# Patient Record
Sex: FEMALE | Race: BLACK OR AFRICAN AMERICAN | Employment: UNEMPLOYED | URBAN - METROPOLITAN AREA
[De-identification: names, ages, dates, MRNs, and addresses within clinical notes are randomized per-mention and may not be internally consistent; named-entity substitution may affect disease eponyms.]

---

## 2017-06-22 ENCOUNTER — HOSPITAL ENCOUNTER (EMERGENCY)
Age: 32
Discharge: HOME | End: 2017-06-22
Attending: EMERGENCY MEDICINE
Payer: MEDICAID

## 2017-06-22 VITALS
RESPIRATION RATE: 16 BRPM | WEIGHT: 152 LBS | SYSTOLIC BLOOD PRESSURE: 132 MMHG | OXYGEN SATURATION: 98 % | DIASTOLIC BLOOD PRESSURE: 88 MMHG | HEART RATE: 76 BPM | BODY MASS INDEX: 25.95 KG/M2 | HEIGHT: 64 IN | TEMPERATURE: 98.5 F

## 2017-06-22 DIAGNOSIS — S01.81XA LACERATION OF FACE, INITIAL ENCOUNTER: Primary | ICD-10-CM

## 2017-06-22 PROCEDURE — 74011000250 HC RX REV CODE- 250: Performed by: PHYSICIAN ASSISTANT

## 2017-06-22 PROCEDURE — 74011250637 HC RX REV CODE- 250/637: Performed by: PHYSICIAN ASSISTANT

## 2017-06-22 PROCEDURE — 99284 EMERGENCY DEPT VISIT MOD MDM: CPT

## 2017-06-22 RX ORDER — MORPHINE SULFATE 2 MG/ML
4 INJECTION, SOLUTION INTRAMUSCULAR; INTRAVENOUS
Status: DISCONTINUED | OUTPATIENT
Start: 2017-06-22 | End: 2017-06-22 | Stop reason: HOSPADM

## 2017-06-22 RX ORDER — IBUPROFEN 400 MG/1
400 TABLET ORAL
Status: COMPLETED | OUTPATIENT
Start: 2017-06-22 | End: 2017-06-22

## 2017-06-22 RX ORDER — HYDROCODONE BITARTRATE AND ACETAMINOPHEN 5; 325 MG/1; MG/1
1 TABLET ORAL
Qty: 6 TAB | Refills: 0 | Status: SHIPPED | OUTPATIENT
Start: 2017-06-22 | End: 2017-12-12

## 2017-06-22 RX ORDER — NAPROXEN 500 MG/1
500 TABLET ORAL 2 TIMES DAILY WITH MEALS
Qty: 20 TAB | Refills: 0 | Status: SHIPPED | OUTPATIENT
Start: 2017-06-22 | End: 2017-07-02

## 2017-06-22 RX ORDER — LIDOCAINE HYDROCHLORIDE 20 MG/ML
5 INJECTION, SOLUTION INFILTRATION; PERINEURAL ONCE
Status: COMPLETED | OUTPATIENT
Start: 2017-06-22 | End: 2017-06-22

## 2017-06-22 RX ADMIN — IBUPROFEN 400 MG: 400 TABLET, FILM COATED ORAL at 10:42

## 2017-06-22 RX ADMIN — LIDOCAINE HYDROCHLORIDE 100 MG: 20 INJECTION, SOLUTION INFILTRATION; PERINEURAL at 10:43

## 2017-06-22 RX ADMIN — NEOMYCIN-BACITRACIN-POLYMYXIN OINT 1 PACKET: OINTMENT at 11:33
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
## 2017-06-22 NOTE — FORENSIC NURSE
[facility] Mustian contacted by Jeannine Kendall RN; reason for being evaluated discussed. Patient declines law enforcement and forensic involvement at this time. RN denies any concerns for safety.

## 2017-06-22 NOTE — DISCHARGE INSTRUCTIONS
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
## 2017-06-22 NOTE — PROGRESS NOTES
Spiritual Care Partner Volunteer visited patient in Emergency Room on 6/22/17.   Documented by:  Jenni Cho [address] [facility] [place] ([id number])
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
## 2017-12-12 ENCOUNTER — HOSPITAL ENCOUNTER (INPATIENT)
Age: 32
LOS: 2 days | Discharge: HOME | DRG: 885 | End: 2017-12-15
Attending: EMERGENCY MEDICINE | Admitting: PSYCHIATRY & NEUROLOGY
Payer: SUBSIDIZED

## 2017-12-12 DIAGNOSIS — F19.20 POLYSUBSTANCE DEPENDENCE (HCC): ICD-10-CM

## 2017-12-12 DIAGNOSIS — F19.94 SUBSTANCE INDUCED MOOD DISORDER (HCC): Primary | ICD-10-CM

## 2017-12-12 PROBLEM — F31.9 BIPOLAR DISORDER (HCC): Status: ACTIVE | Noted: 2017-12-12

## 2017-12-12 LAB
AMPHET UR QL SCN: NEGATIVE
ANION GAP SERPL CALC-SCNC: 7 MMOL/L (ref 5–15)
APAP SERPL-MCNC: <2 UG/ML (ref 10–30)
APPEARANCE UR: ABNORMAL
BARBITURATES UR QL SCN: NEGATIVE
BASOPHILS # BLD: 0 K/UL (ref 0–0.1)
BASOPHILS NFR BLD: 0 % (ref 0–1)
BENZODIAZ UR QL: NEGATIVE
BILIRUB UR QL: NEGATIVE
BUN SERPL-MCNC: 11 MG/DL (ref 6–20)
BUN/CREAT SERPL: 10 (ref 12–20)
CALCIUM SERPL-MCNC: 9.2 MG/DL (ref 8.5–10.1)
CANNABINOIDS UR QL SCN: POSITIVE
CHLORIDE SERPL-SCNC: 104 MMOL/L (ref 97–108)
CO2 SERPL-SCNC: 29 MMOL/L (ref 21–32)
COCAINE UR QL SCN: POSITIVE
COLOR UR: ABNORMAL
CREAT SERPL-MCNC: 1.15 MG/DL (ref 0.55–1.02)
DRUG SCRN COMMENT,DRGCM: ABNORMAL
EOSINOPHIL # BLD: 0.1 K/UL (ref 0–0.4)
EOSINOPHIL NFR BLD: 1 % (ref 0–7)
ERYTHROCYTE [DISTWIDTH] IN BLOOD BY AUTOMATED COUNT: 14.4 % (ref 11.5–14.5)
ETHANOL SERPL-MCNC: <10 MG/DL
GLUCOSE SERPL-MCNC: 93 MG/DL (ref 65–100)
GLUCOSE UR STRIP.AUTO-MCNC: NEGATIVE MG/DL
HCG UR QL: NEGATIVE
HCT VFR BLD AUTO: 43.2 % (ref 35–47)
HGB BLD-MCNC: 13.8 G/DL (ref 11.5–16)
HGB UR QL STRIP: NEGATIVE
KETONES UR QL STRIP.AUTO: NEGATIVE MG/DL
LEUKOCYTE ESTERASE UR QL STRIP.AUTO: NEGATIVE
LYMPHOCYTES # BLD: 2 K/UL (ref 0.8–3.5)
LYMPHOCYTES NFR BLD: 20 % (ref 12–49)
MCH RBC QN AUTO: 26.7 PG (ref 26–34)
MCHC RBC AUTO-ENTMCNC: 31.9 G/DL (ref 30–36.5)
MCV RBC AUTO: 83.6 FL (ref 80–99)
METHADONE UR QL: POSITIVE
MONOCYTES # BLD: 0.3 K/UL (ref 0–1)
MONOCYTES NFR BLD: 3 % (ref 5–13)
NEUTS SEG # BLD: 7.4 K/UL (ref 1.8–8)
NEUTS SEG NFR BLD: 76 % (ref 32–75)
NITRITE UR QL STRIP.AUTO: NEGATIVE
OPIATES UR QL: POSITIVE
PCP UR QL: NEGATIVE
PH UR STRIP: 6.5 [PH] (ref 5–8)
PLATELET # BLD AUTO: 270 K/UL (ref 150–400)
POTASSIUM SERPL-SCNC: 3.5 MMOL/L (ref 3.5–5.1)
PROT UR STRIP-MCNC: NEGATIVE MG/DL
RBC # BLD AUTO: 5.17 M/UL (ref 3.8–5.2)
SALICYLATES SERPL-MCNC: 2.8 MG/DL (ref 2.8–20)
SODIUM SERPL-SCNC: 140 MMOL/L (ref 136–145)
SP GR UR REFRACTOMETRY: 1.03 (ref 1–1.03)
UROBILINOGEN UR QL STRIP.AUTO: 1 EU/DL (ref 0.2–1)
WBC # BLD AUTO: 9.7 K/UL (ref 3.6–11)

## 2017-12-12 PROCEDURE — 81003 URINALYSIS AUTO W/O SCOPE: CPT | Performed by: EMERGENCY MEDICINE

## 2017-12-12 PROCEDURE — 74011250637 HC RX REV CODE- 250/637: Performed by: EMERGENCY MEDICINE

## 2017-12-12 PROCEDURE — 36415 COLL VENOUS BLD VENIPUNCTURE: CPT | Performed by: EMERGENCY MEDICINE

## 2017-12-12 PROCEDURE — 81025 URINE PREGNANCY TEST: CPT

## 2017-12-12 PROCEDURE — 80307 DRUG TEST PRSMV CHEM ANLYZR: CPT | Performed by: EMERGENCY MEDICINE

## 2017-12-12 PROCEDURE — 99285 EMERGENCY DEPT VISIT HI MDM: CPT

## 2017-12-12 PROCEDURE — 90791 PSYCH DIAGNOSTIC EVALUATION: CPT

## 2017-12-12 PROCEDURE — 85025 COMPLETE CBC W/AUTO DIFF WBC: CPT | Performed by: EMERGENCY MEDICINE

## 2017-12-12 PROCEDURE — 80048 BASIC METABOLIC PNL TOTAL CA: CPT | Performed by: EMERGENCY MEDICINE

## 2017-12-12 RX ORDER — CLONIDINE HYDROCHLORIDE 0.1 MG/1
0.2 TABLET ORAL
Status: COMPLETED | OUTPATIENT
Start: 2017-12-12 | End: 2017-12-12

## 2017-12-12 RX ADMIN — CLONIDINE HYDROCHLORIDE 0.2 MG: 0.1 TABLET ORAL at 22:05

## 2017-12-12 NOTE — IP AVS SNAPSHOT
Lidya Wiley 
 
 
 [address] Patient: Elie Guerra MRN: [id number] QWM:[birth year] About your hospitalization You were admitted on:  December 13, 2017 You last received care in the:  [facility]. [address] You were discharged on:  December 15, 2017 Why you were hospitalized Your primary diagnosis was:  Not on File Your diagnoses also included:  Bipolar Disorder (Hcc), Substance Induced Mood Disorder (Hcc) Things You Need To Do (next 8 weeks) Follow up with Debbi Hawthorne MD  
  
Phone:  [phone] Where:  [address] [facility],[address]Th Floor [facility], [address] [place] [address] Go to [address] [place] [address] in 1 week(s) Intake appointment for services. Due to the limited slots, you may want to arrive by 7:00am. The doors open at 7:30am and you should sign in at the registration desk. Where:  [facility] [address] Hours: Monday-Friday 8:30am- 12:00PM & 1:00PM-4:30pm  
[phone] (phone) [phone] (fax) Go to [facility] REGISTRATION TIME IS FROM 7:00pm-9:00pm.  
  
Where:  [address] [facility], [address] [facility]. Hours of operation are 7:00 pm to 10:00 am.    
  
   
 Monday Dec 18, 2017 Go to [facility] Rapid access appointment 8:00AM -11:00AM & 12:00PM-3:00PM for mental health and substance abuse services. Where:  [employer] [address] [facility] 
[phone] Fax: [phone] Discharge Orders None A check silviano indicates which time of day the medication should be taken. My Medications Notice You have not been prescribed any medications. Discharge Instructions DISCHARGE SUMMARY from Nurse The following personal items are in your possession at time of discharge: 
 
  
Visual Aid: None Jewelry: Earrings (ordaz earrings in purse) Clothing: Belt, Jacket/Coat, Slippers, Other (comment) (scarf,head scarf) Other Valuables: Cell Phone, Purse, Wallet, Cigarettes, Lighter/matches (2 cell phones cigarettes lighter 15cents) PATIENT INSTRUCTIONS: 
 
If I feel that I can not keep these promises and I am at risk of hurting myself or others, I will call the crisis office and speak with a crisis worker who will assist me during my crisis. [address] [facility]  [phone]   [phone] [facility] [place] [facility]  [phone] [facility]  [phone] These are general instructions for a healthy lifestyle: No smoking/ No tobacco products/ Avoid exposure to second hand smoke Surgeon General's Warning:  Quitting smoking now greatly reduces serious risk to your health. Obesity, smoking, and sedentary lifestyle greatly increases your risk for illness A healthy diet, regular physical exercise & weight monitoring are important for maintaining a healthy lifestyle You may be retaining fluid if you have a history of heart failure or if you experience any of the following symptoms:  Weight gain of 3 pounds or more overnight or 5 pounds in a week, increased swelling in our hands or feet or shortness of breath while lying flat in bed. Please call your doctor as soon as you notice any of these symptoms; do not wait until your next office visit. Recognize signs and symptoms of STROKE: 
 
F-face looks uneven A-arms unable to move or move unevenly S-speech slurred or non-existent T-time-call 911 as soon as signs and symptoms begin-DO NOT go Back to bed or wait to see if you get better-TIME IS BRAIN. Warning Signs of HEART ATTACK Call 911 if you have these symptoms: 
? Chest discomfort.  Most heart attacks involve discomfort in the center of the chest that lasts more than a few minutes, or that goes away and comes back. It can feel like uncomfortable pressure, squeezing, fullness, or pain. ? Discomfort in other areas of the upper body. Symptoms can include pain or discomfort in one or both arms, the back, neck, jaw, or stomach. ? Shortness of breath with or without chest discomfort. ? Other signs may include breaking out in a cold sweat, nausea, or lightheadedness. Don't wait more than five minutes to call [address]! Fast action can save your life. Calling 911 is almost always the fastest way to get lifesaving treatment. Emergency Medical Services staff can begin treatment when they arrive  up to an hour sooner than if someone gets to the hospital by car. Myself and/or my family have received education about my diagnosis throughout my hospital stay. During my hospital stay, I and/or my family/caregiver were included in planning my care upon discharge. My needs were taken into consideration and I was included in my discharge planning. I had an opportunity to ask questions. The discharge information has been reviewed with the patient. The patient verbalized understanding. Discharge medications reviewed with the patient and appropriate educational materials and side effects teaching were provided. [facility] Announcement We are excited to announce that we are making your provider's discharge notes available to you in [employer] [facility]. You will see these notes when they are completed and signed by the physician that discharged you from your recent hospital stay. If you have any questions or concerns about any information you see in [employer], please call the Health Information Department where you were seen or reach out to your Primary Care Provider for more information about your plan of care. Introducing [facility] SERVICES!    
 [employer] introduces [employer] [facility] patient portal. Now you can access parts of your medical record, email your doctor's office, and request medication refills online. 1. In your internet browser, go to [email or web address] 2. Click on the First Time User? Click Here link in the Sign In box. You will see the New Member Sign Up page. 3. Enter your [employer] Access Code exactly as it appears below. You will not need to use this code after youve completed the sign-up process. If you do not sign up before the expiration date, you must request a new code. · [employer] Access Code: [email or web address] Expires: 3/14/2018 10:37 PM 
 
4. Enter the last four digits of your Social Security Number (xxxx) and Date of Birth (mm/dd/yyyy) as indicated and click Submit. You will be taken to the next sign-up page. 5. Create a [employer] ID. This will be your [employer] login ID and cannot be changed, so think of one that is secure and easy to remember. 6. Create a [employer] password. You can change your password at any time. 7. Enter your Password Reset Question and Answer. This can be used at a later time if you forget your password. 8. Enter your e-mail address. You will receive e-mail notification when new information is available in [address] 9. Click Sign Up. You can now view and download portions of your medical record. 10. Click the Download Summary menu link to download a portable copy of your medical information. If you have questions, please visit the Frequently Asked Questions section of the [employer] website. Remember, [employer] is NOT to be used for urgent needs. For medical emergencies, dial 911. Now available from your iPhone and Android! Unresulted Labs-Please follow up with your PCP about these lab tests Order Current Status EKG, 12 LEAD, INITIAL Preliminary result Providers Seen During Your Hospitalization Provider Specialty Primary office phone Zara Sanchez MD Emergency Medicine [phone] Jono Carrero MD Psychiatry [phone] Margi Barcaly MD Psychiatry [phone] Immunizations Administered for This Admission Name Date Influenza Vaccine (Quad) PF 12/13/2017 Your Primary Care Physician (PCP) Primary Care Physician Office Phone Office Fax Ana, [address] [phone] You are allergic to the following Allergen Reactions Tramadol Rash Recent Documentation Height Weight Breastfeeding? BMI OB Status Smoking Status 1.626 m 59.9 kg No 22.66 kg/m2 Having regular periods Current Every Day Smoker Emergency Contacts Name Discharge Info Relation Home Work Mobile [address] CAREGIVER [3] Other Relative [6] [phone] [address] CAREGIVER [3] Parent [1] [phone] Patient Belongings The following personal items are in your possession at time of discharge: 
     Visual Aid: None          Jewelry: Earrings (ordaz earrings in purse)  Clothing: Belt, Jacket/Coat, Slippers, Other (comment) (scarf,head scarf)    Other Valuables: Cell Phone, Purse, Wallet, Cigarettes, Lighter/matches (2 cell phones cigarettes lighter 15cents) Please provide this summary of care documentation to your next provider. Signatures-by signing, you are acknowledging that this After Visit Summary has been reviewed with you and you have received a copy. Patient Signature:  ____________________________________________________________ Date:  ____________________________________________________________  
  
[facility] Provider Signature:  ____________________________________________________________ Date:  ____________________________________________________________

## 2017-12-12 NOTE — IP AVS SNAPSHOT
[address] [place] [address] 
 
 
 [address] Patient: Lee Osler MRN: [id number] ROM:8/1/1708 My Medications Notice You have not been prescribed any medications.

## 2017-12-13 LAB
CHOLEST SERPL-MCNC: 128 MG/DL
HDLC SERPL-MCNC: 56 MG/DL
HDLC SERPL: 2.3 {RATIO} (ref 0–5)
LDLC SERPL CALC-MCNC: 55.2 MG/DL (ref 0–100)
LIPID PROFILE,FLP: NORMAL
TRIGL SERPL-MCNC: 84 MG/DL (ref ?–150)
TSH SERPL DL<=0.05 MIU/L-ACNC: 1.21 UIU/ML (ref 0.36–3.74)
VLDLC SERPL CALC-MCNC: 16.8 MG/DL

## 2017-12-13 PROCEDURE — 65220000003 HC RM SEMIPRIVATE PSYCH

## 2017-12-13 PROCEDURE — 36415 COLL VENOUS BLD VENIPUNCTURE: CPT | Performed by: PSYCHIATRY & NEUROLOGY

## 2017-12-13 PROCEDURE — 74011250636 HC RX REV CODE- 250/636: Performed by: PSYCHIATRY & NEUROLOGY

## 2017-12-13 PROCEDURE — 84443 ASSAY THYROID STIM HORMONE: CPT | Performed by: PSYCHIATRY & NEUROLOGY

## 2017-12-13 PROCEDURE — 74011250637 HC RX REV CODE- 250/637: Performed by: PSYCHIATRY & NEUROLOGY

## 2017-12-13 PROCEDURE — 90471 IMMUNIZATION ADMIN: CPT

## 2017-12-13 PROCEDURE — 90686 IIV4 VACC NO PRSV 0.5 ML IM: CPT | Performed by: PSYCHIATRY & NEUROLOGY

## 2017-12-13 PROCEDURE — 80061 LIPID PANEL: CPT | Performed by: PSYCHIATRY & NEUROLOGY

## 2017-12-13 RX ORDER — IBUPROFEN 400 MG/1
400 TABLET ORAL
Status: DISCONTINUED | OUTPATIENT
Start: 2017-12-13 | End: 2017-12-15 | Stop reason: HOSPADM

## 2017-12-13 RX ORDER — LORAZEPAM 2 MG/ML
2 INJECTION INTRAMUSCULAR
Status: DISCONTINUED | OUTPATIENT
Start: 2017-12-13 | End: 2017-12-13

## 2017-12-13 RX ORDER — BENZTROPINE MESYLATE 2 MG/1
2 TABLET ORAL
Status: DISCONTINUED | OUTPATIENT
Start: 2017-12-13 | End: 2017-12-15 | Stop reason: HOSPADM

## 2017-12-13 RX ORDER — BENZTROPINE MESYLATE 1 MG/ML
2 INJECTION INTRAMUSCULAR; INTRAVENOUS
Status: DISCONTINUED | OUTPATIENT
Start: 2017-12-13 | End: 2017-12-15 | Stop reason: HOSPADM

## 2017-12-13 RX ORDER — OLANZAPINE 5 MG/1
5 TABLET ORAL
Status: DISCONTINUED | OUTPATIENT
Start: 2017-12-13 | End: 2017-12-15 | Stop reason: HOSPADM

## 2017-12-13 RX ORDER — ADHESIVE BANDAGE
30 BANDAGE TOPICAL DAILY PRN
Status: DISCONTINUED | OUTPATIENT
Start: 2017-12-13 | End: 2017-12-15 | Stop reason: HOSPADM

## 2017-12-13 RX ORDER — LORAZEPAM 1 MG/1
1 TABLET ORAL
Status: DISCONTINUED | OUTPATIENT
Start: 2017-12-13 | End: 2017-12-13

## 2017-12-13 RX ORDER — METHADONE HYDROCHLORIDE 10 MG/1
20 TABLET ORAL
Status: COMPLETED | OUTPATIENT
Start: 2017-12-13 | End: 2017-12-13

## 2017-12-13 RX ORDER — ACETAMINOPHEN 325 MG/1
650 TABLET ORAL
Status: DISCONTINUED | OUTPATIENT
Start: 2017-12-13 | End: 2017-12-15 | Stop reason: HOSPADM

## 2017-12-13 RX ORDER — ZOLPIDEM TARTRATE 10 MG/1
10 TABLET ORAL
Status: DISCONTINUED | OUTPATIENT
Start: 2017-12-13 | End: 2017-12-15 | Stop reason: HOSPADM

## 2017-12-13 RX ORDER — IBUPROFEN 200 MG
1 TABLET ORAL
Status: DISCONTINUED | OUTPATIENT
Start: 2017-12-13 | End: 2017-12-15 | Stop reason: HOSPADM

## 2017-12-13 RX ADMIN — ZOLPIDEM TARTRATE 10 MG: 10 TABLET, FILM COATED ORAL at 21:28

## 2017-12-13 RX ADMIN — INFLUENZA VIRUS VACCINE 0.5 ML: 15; 15; 15; 15 SUSPENSION INTRAMUSCULAR at 09:44

## 2017-12-13 RX ADMIN — METHADONE HYDROCHLORIDE 20 MG: 10 TABLET ORAL at 18:22

## 2017-12-13 NOTE — ED PROVIDER NOTES
EMERGENCY DEPARTMENT HISTORY AND PHYSICAL EXAM      Date: 2017  Patient Name: Neetu Weinstein    History of Presenting Illness     Chief Complaint   Patient presents with    Suicidal    Drug Problem       History Provided By: Patient    HPI: Neetu Weinstein, 34 y.o. female with PMHx significant for depressive disorder who presents ambulatory to the ED in need of a mental health evaluation. Pt reports that she was discharged from the methadone clinic yesterday. She reports being referred to the ED by Human resources. Pt reports having SI noting that she is unsure if she would do it. She also reports having HI but reports that she \"knows she does not want to hurt anyone. \" Pt also c/o generalize body aches that she describes at [facility] like I'm going through withdrawal.\" She reports associated diaphoresis, and abdominal pain. Pt denies use of medication to modify her symptoms. She specifically denies any nausea, vomiting, diarrhea, CP, SOB, fevers, chills, or HA.    + tobacco use (0.5 ppd), + EtOH use, + Illicit drug use    PCP: Allyson Galan MD    There are no other complaints, changes, or physical findings at this time.         Past History     Past Medical History:  Past Medical History:   Diagnosis Date    Depressive disorder, not elsewhere classified 2014       Past Surgical History:  Past Surgical History:   Procedure Laterality Date    BREAST SURGERY PROCEDURE UNLISTED      cyst removal    HX GYN          HX OTHER SURGICAL      fibroid removal left breast       Family History:  Family History   Problem Relation Age of Onset    Malignant Hyperthermia Neg Hx     Pseudocholinesterase Deficiency Neg Hx     Delayed Awakening Neg Hx     Post-op Nausea/Vomiting Neg Hx     Emergence Delirium Neg Hx     Post-op Cognitive Dysfunction Neg Hx     Other Neg Hx        Social History:  Social History   Substance Use Topics    Smoking status: Current Every Day Smoker     Packs/day: 0.50     Years: 10.00    Smokeless tobacco: Current User    Alcohol use Yes      Comment: socially       Allergies: Allergies   Allergen Reactions    Tramadol Rash         Review of Systems   Review of Systems   Constitutional: Positive for diaphoresis. Negative for chills, fever and unexpected weight change. HENT: Negative. Negative for congestion and trouble swallowing. Eyes: Negative for discharge. Respiratory: Negative. Negative for cough, chest tightness and shortness of breath. Cardiovascular: Negative. Negative for chest pain. Gastrointestinal: Positive for abdominal pain. Negative for abdominal distention, constipation, diarrhea, nausea and vomiting. Endocrine: Negative. Genitourinary: Negative. Negative for difficulty urinating, dysuria, frequency and urgency. Musculoskeletal: Positive for myalgias (generalized). Negative for arthralgias. Skin: Negative. Negative for color change. Allergic/Immunologic: Negative. Neurological: Negative. Negative for dizziness, speech difficulty and headaches. Hematological: Negative. Psychiatric/Behavioral: Negative. Negative for agitation, confusion and suicidal ideas. - HI   All other systems reviewed and are negative. Physical Exam   Physical Exam   Constitutional: She is oriented to person, place, and time. She appears well-developed and well-nourished. HENT:   Head: Normocephalic and atraumatic. Eyes: Conjunctivae and EOM are normal.   Pupils are 3 mm and reactive   Neck: Neck supple. Cardiovascular: Normal rate, regular rhythm and intact distal pulses. Pulmonary/Chest: Effort normal. No respiratory distress. Abdominal: Soft. Bowel sounds are increased. There is no tenderness. Musculoskeletal: Normal range of motion. She exhibits no deformity. Neurological: She is alert and oriented to person, place, and time. Skin: Skin is warm and dry. Psychiatric: Thought content normal. She is agitated.    Tearful   Vitals reviewed. Diagnostic Study Results     Labs -     Recent Results (from the past 12 hour(s))   CBC WITH AUTOMATED DIFF    Collection Time: 12/12/17  9:44 PM   Result Value Ref Range    WBC 9.7 3.6 - 11.0 K/uL    RBC 5.17 3.80 - 5.20 M/uL    HGB 13.8 11.5 - 16.0 g/dL    HCT 43.2 35.0 - 47.0 %    MCV 83.6 80.0 - 99.0 FL    MCH 26.7 26.0 - 34.0 PG    MCHC 31.9 30.0 - 36.5 g/dL    RDW 14.4 11.5 - 14.5 %    PLATELET 388 529 - 786 K/uL    NEUTROPHILS 76 (H) 32 - 75 %    LYMPHOCYTES 20 12 - 49 %    MONOCYTES 3 (L) 5 - 13 %    EOSINOPHILS 1 0 - 7 %    BASOPHILS 0 0 - 1 %    ABS. NEUTROPHILS 7.4 1.8 - 8.0 K/UL    ABS. LYMPHOCYTES 2.0 0.8 - 3.5 K/UL    ABS. MONOCYTES 0.3 0.0 - 1.0 K/UL    ABS. EOSINOPHILS 0.1 0.0 - 0.4 K/UL    ABS.  BASOPHILS 0.0 0.0 - 0.1 K/UL   URINALYSIS W/ RFLX MICROSCOPIC    Collection Time: 12/12/17  9:44 PM   Result Value Ref Range    Color YELLOW/STRAW      Appearance CLOUDY (A) CLEAR      Specific gravity 1.030 1.003 - 1.030      pH (UA) 6.5 5.0 - 8.0      Protein NEGATIVE  NEG mg/dL    Glucose NEGATIVE  NEG mg/dL    Ketone NEGATIVE  NEG mg/dL    Bilirubin NEGATIVE  NEG      Blood NEGATIVE  NEG      Urobilinogen 1.0 0.2 - 1.0 EU/dL    Nitrites NEGATIVE  NEG      Leukocyte Esterase NEGATIVE  NEG     METABOLIC PANEL, BASIC    Collection Time: 12/12/17  9:44 PM   Result Value Ref Range    Sodium 140 136 - 145 mmol/L    Potassium 3.5 3.5 - 5.1 mmol/L    Chloride 104 97 - 108 mmol/L    CO2 29 21 - 32 mmol/L    Anion gap 7 5 - 15 mmol/L    Glucose 93 65 - 100 mg/dL    BUN 11 6 - 20 MG/DL    Creatinine 1.15 (H) 0.55 - 1.02 MG/DL    BUN/Creatinine ratio 10 (L) 12 - 20      GFR est AA >60 >60 ml/min/1.73m2    GFR est non-AA 54 (L) >60 ml/min/1.73m2    Calcium 9.2 8.5 - 10.1 MG/DL   DRUG SCREEN, URINE    Collection Time: 12/12/17  9:44 PM   Result Value Ref Range    AMPHETAMINES NEGATIVE  NEG      BARBITURATES NEGATIVE  NEG      BENZODIAZEPINES NEGATIVE  NEG      COCAINE POSITIVE (A) NEG      METHADONE POSITIVE (A) NEG      OPIATES POSITIVE (A) NEG      PCP(PHENCYCLIDINE) NEGATIVE  NEG      THC (TH-CANNABINOL) POSITIVE (A) NEG      Drug screen comment (NOTE)    ETHYL ALCOHOL    Collection Time: 12/12/17  9:44 PM   Result Value Ref Range    ALCOHOL(ETHYL),SERUM <10 <10 MG/DL   ACETAMINOPHEN    Collection Time: 12/12/17  9:44 PM   Result Value Ref Range    Acetaminophen level <2 (L) 10 - 30 ug/mL   SALICYLATE    Collection Time: 12/12/17  9:44 PM   Result Value Ref Range    Salicylate level 2.8 2.8 - 20.0 MG/DL   HCG URINE, QL. - POC    Collection Time: 12/12/17  9:47 PM   Result Value Ref Range    Pregnancy test,urine (POC) NEGATIVE  NEG       Medical Decision Making   I am the first provider for this patient. I reviewed the vital signs, available nursing notes, past medical history, past surgical history, family history and social history. Vital Signs-Reviewed the patient's vital signs. Patient Vitals for the past 12 hrs:   Temp Pulse Resp BP SpO2   12/12/17 2332 - 67 16 111/76 100 %   12/12/17 2317 98.3 °F (36.8 °C) 76 16 (!) 131/96 100 %   12/12/17 2158 98.2 °F (36.8 °C) 67 20 132/69 100 %   12/12/17 2119 98.7 °F (37.1 °C) (!) 105 18 149/71 100 %     Records Reviewed: Nursing Notes and Old Medical Records    Provider Notes (Medical Decision Making): Withdrawal, depression, SI, polysubstance abuse    ED Course:   Initial assessment performed. The patients presenting problems have been discussed, and they are in agreement with the care plan formulated and outlined with them. I have encouraged them to ask questions as they arise throughout their visit. Disposition:    ADMIT NOTE:  11:50 AM  Patient is being admitted to the hospital.  The results of their tests and reasons for their admission have been discussed with them and/or available family. They convey agreement and understanding for the need to be admitted and for their admission diagnosis.   Consultation has been made with the inpatient physician specialist for hospitalization. PLAN:  1. Admit to Behavioral Health    Diagnosis     Clinical Impression: No diagnosis found. Attestations: This note is prepared by Charmayne Cohen, acting as Scribe for Khanh Miles MD.    Khanh Miles MD: The scribe's documentation has been prepared under my direction and personally reviewed by me in its entirety. I confirm that the note above accurately reflects all work, treatment, procedures, and medical decision making performed by me.

## 2017-12-13 NOTE — ED TRIAGE NOTES
Per pt she was on Methadone and was taken off yesterday and now feels suicidal, complains of body pains

## 2017-12-13 NOTE — H&P
History and Physical    Subjective:     Bryson Estrada is a 34 y.o. with past medical hx significant for substance abuse. Pt admitted under a voluntary basis for opiate withdrawal and depression proving to be a danger to self and others. Pt is asking for Methadone. She is maintained on Methadone in one of the local detox clinics. Pt is a 34 y.o. BLACK OR  female with a past psychiatric history significant for opiate withdrawal , who presents at this time with complaints of (and/or evidence of) the following emotional symptoms: depression. Additional symptomatology include anxiety and anxiety attacks. The above symptoms have been present for weeks . These symptoms are of moderate severity. These symptoms are constant  in nature. The patient's condition has been precipitated by leaving Methadone program and psychosocial stressors (unemplyed  ). Patient's condition made worse by continued illicit drug use treatment noncompliance. UDS: positive  BAL=0. She is not feeling suicidal and no psychotic features andno manic symptoms . She is c/o abdominal pain due to withdrawal from opiate.  She is asking for Methadone        Past Medical History:   Diagnosis Date    Depressive disorder, not elsewhere classified 2014      Past Surgical History:   Procedure Laterality Date    BREAST SURGERY PROCEDURE UNLISTED      cyst removal    HX GYN          HX OTHER SURGICAL      fibroid removal left breast     Family History   Problem Relation Age of Onset    Malignant Hyperthermia Neg Hx     Pseudocholinesterase Deficiency Neg Hx     Delayed Awakening Neg Hx     Post-op Nausea/Vomiting Neg Hx     Emergence Delirium Neg Hx     Post-op Cognitive Dysfunction Neg Hx     Other Neg Hx       Social History   Substance Use Topics    Smoking status: Current Every Day Smoker     Packs/day: 0.50     Years: 10.00    Smokeless tobacco: Current User    Alcohol use Yes      Comment: socially Prior to Admission medications    Not on File     Allergies   Allergen Reactions    Tramadol Rash        Review of Systems:  Constitutional: negative  Eyes: negative  Ears, Nose, Mouth, Throat, and Face: negative  Respiratory: negative  Cardiovascular: negative  Gastrointestinal: negative  Genitourinary:negative  Integument/Breast: negative  Hematologic/Lymphatic: negative  Musculoskeletal:negative  Neurological: negative  Behavioral/Psychiatric: negative  Endocrine: negative  Allergic/Immunologic: negative     Objective: Intake and Output:            Physical Exam:   Visit Vitals    BP 94/59 (BP 1 Location: Right arm, BP Patient Position: Sitting)    Pulse (!) 55    Temp 97 °F (36.1 °C)    Resp 20    Ht 5' 4\" (1.626 m)    Wt 59.9 kg (132 lb)    LMP 12/03/2017    SpO2 100%    Breastfeeding No    BMI 22.66 kg/m2     General:  Alert, cooperative, no distress, appears stated age. Head:  Normocephalic, without obvious abnormality, atraumatic. Eyes:  Conjunctivae/corneas clear. PERRL, EOMs intact. Ears:  Normal external ear canals both ears. Nose: Nares normal. Septum midline. Mucosa normal. No drainage or sinus tenderness. Throat: Lips, mucosa, and tongue normal. Teeth and gums normal.   Neck: Supple, symmetrical, trachea midline, no adenopathy, thyroid: no enlargement/tenderness/nodules, no carotid bruit and no JVD. Back:   Symmetric, no curvature. ROM normal. No CVA tenderness. Lungs:   Clear to auscultation bilaterally. Chest wall:  No tenderness or deformity. Heart:  Regular rate and rhythm, S1, S2 normal, no murmur, click, rub or gallop. Abdomen:   Soft, non-tender. Bowel sounds normal. No masses,  No organomegaly. Extremities: Extremities normal, atraumatic, no cyanosis or edema. Pulses: 2+ and symmetric all extremities.    Skin: Skin color, texture, turgor normal. No rashes or lesions   Lymph nodes: Cervical, supraclavicular, and axillary nodes normal.   Neurologic: CNII-XII intact. Normal strength, sensation and reflexes throughout. Data Review:   Recent Results (from the past 24 hour(s))   CBC WITH AUTOMATED DIFF    Collection Time: 12/12/17  9:44 PM   Result Value Ref Range    WBC 9.7 3.6 - 11.0 K/uL    RBC 5.17 3.80 - 5.20 M/uL    HGB 13.8 11.5 - 16.0 g/dL    HCT 43.2 35.0 - 47.0 %    MCV 83.6 80.0 - 99.0 FL    MCH 26.7 26.0 - 34.0 PG    MCHC 31.9 30.0 - 36.5 g/dL    RDW 14.4 11.5 - 14.5 %    PLATELET 197 223 - 530 K/uL    NEUTROPHILS 76 (H) 32 - 75 %    LYMPHOCYTES 20 12 - 49 %    MONOCYTES 3 (L) 5 - 13 %    EOSINOPHILS 1 0 - 7 %    BASOPHILS 0 0 - 1 %    ABS. NEUTROPHILS 7.4 1.8 - 8.0 K/UL    ABS. LYMPHOCYTES 2.0 0.8 - 3.5 K/UL    ABS. MONOCYTES 0.3 0.0 - 1.0 K/UL    ABS. EOSINOPHILS 0.1 0.0 - 0.4 K/UL    ABS.  BASOPHILS 0.0 0.0 - 0.1 K/UL   URINALYSIS W/ RFLX MICROSCOPIC    Collection Time: 12/12/17  9:44 PM   Result Value Ref Range    Color YELLOW/STRAW      Appearance CLOUDY (A) CLEAR      Specific gravity 1.030 1.003 - 1.030      pH (UA) 6.5 5.0 - 8.0      Protein NEGATIVE  NEG mg/dL    Glucose NEGATIVE  NEG mg/dL    Ketone NEGATIVE  NEG mg/dL    Bilirubin NEGATIVE  NEG      Blood NEGATIVE  NEG      Urobilinogen 1.0 0.2 - 1.0 EU/dL    Nitrites NEGATIVE  NEG      Leukocyte Esterase NEGATIVE  NEG     METABOLIC PANEL, BASIC    Collection Time: 12/12/17  9:44 PM   Result Value Ref Range    Sodium 140 136 - 145 mmol/L    Potassium 3.5 3.5 - 5.1 mmol/L    Chloride 104 97 - 108 mmol/L    CO2 29 21 - 32 mmol/L    Anion gap 7 5 - 15 mmol/L    Glucose 93 65 - 100 mg/dL    BUN 11 6 - 20 MG/DL    Creatinine 1.15 (H) 0.55 - 1.02 MG/DL    BUN/Creatinine ratio 10 (L) 12 - 20      GFR est AA >60 >60 ml/min/1.73m2    GFR est non-AA 54 (L) >60 ml/min/1.73m2    Calcium 9.2 8.5 - 10.1 MG/DL   DRUG SCREEN, URINE    Collection Time: 12/12/17  9:44 PM   Result Value Ref Range    AMPHETAMINES NEGATIVE  NEG      BARBITURATES NEGATIVE  NEG      BENZODIAZEPINES NEGATIVE  NEG      COCAINE POSITIVE (A) NEG      METHADONE POSITIVE (A) NEG      OPIATES POSITIVE (A) NEG      PCP(PHENCYCLIDINE) NEGATIVE  NEG      THC (TH-CANNABINOL) POSITIVE (A) NEG      Drug screen comment (NOTE)    ETHYL ALCOHOL    Collection Time: 12/12/17  9:44 PM   Result Value Ref Range    ALCOHOL(ETHYL),SERUM <10 <10 MG/DL   ACETAMINOPHEN    Collection Time: 12/12/17  9:44 PM   Result Value Ref Range    Acetaminophen level <2 (L) 10 - 30 ug/mL   SALICYLATE    Collection Time: 12/12/17  9:44 PM   Result Value Ref Range    Salicylate level 2.8 2.8 - 20.0 MG/DL   HCG URINE, QL. - POC    Collection Time: 12/12/17  9:47 PM   Result Value Ref Range    Pregnancy test,urine (POC) NEGATIVE  NEG     LIPID PANEL    Collection Time: 12/13/17  5:15 AM   Result Value Ref Range    LIPID PROFILE          Cholesterol, total 128 <200 MG/DL    Triglyceride 84 <150 MG/DL    HDL Cholesterol 56 MG/DL    LDL, calculated 55.2 0 - 100 MG/DL    VLDL, calculated 16.8 MG/DL    CHOL/HDL Ratio 2.3 0 - 5.0     TSH 3RD GENERATION    Collection Time: 12/13/17  5:15 AM   Result Value Ref Range    TSH 1.21 0.36 - 3.74 uIU/mL       Assessment:     Active Problems:    Bipolar disorder ([address] (12/12/2017)      Substance induced mood disorder (HCC) (12/12/2017)    Opiate withdrawal    Hypotension (normaly low BP)    Plan:     Suggest Methadone Detox. No VTE prophylaxis indicated or necessary at this time.    Signed By: Laurence Toussaint MD     December 13, 2017

## 2017-12-13 NOTE — H&P
INITIAL PSYCHIATRIC EVALUATION            IDENTIFICATION:    Patient Name  Juan Galeano   Date of Birth 1985   [facility] [id number]   Medical Record Number  [id number]      Age  34 y.o. PCP Archie Sloan MD   Admit date:  2017    Room Number  324/02  @ [facility]   Date of Service  2017            HISTORY         REASON FOR HOSPITALIZATION:  CC: \" Pt admitted under a voluntary basis for opiate withdrawal and depression . HISTORY OF PRESENT ILLNESS:    The patient, Juan Galeano, is a 34 y.o. BLACK OR  female with a past psychiatric history significant for opiate withdrawal , who presents at this time with complaints of (and/or evidence of) the following emotional symptoms: depression. Additional symptomatology include anxiety and anxiety attacks. The above symptoms have been present for weeks . These symptoms are of moderate severity. These symptoms are constant  in nature. The patient's condition has been precipitated by leaving Methadone program and psychosocial stressors (unemplyed  ). Patient's condition made worse by continued illicit drug use treatment noncompliance. UDS: positive  BAL=0. She is not feeling suicidal and no psychotic features andno manic symptoms       ALLERGIES:   Allergies   Allergen Reactions    Tramadol Rash      MEDICATIONS PRIOR TO ADMISSION:   No prescriptions prior to admission. PAST MEDICAL HISTORY:   Past Medical History:   Diagnosis Date    Depressive disorder, not elsewhere classified 2014     Past Surgical History:   Procedure Laterality Date    BREAST SURGERY PROCEDURE UNLISTED      cyst removal    HX GYN          HX OTHER SURGICAL      fibroid removal left breast      SOCIAL HISTORY: single    Social History     Social History    Marital status: SINGLE     Spouse name: N/A    Number of children: N/A    Years of education: N/A     Occupational History    Not on file.      Social History Main Topics    Smoking status: Current Every Day Smoker     Packs/day: 0.50     Years: 10.00    Smokeless tobacco: Current User    Alcohol use Yes      Comment: socially    Drug use: Yes     Special: Heroin, Marijuana, Cocaine      Comment: lasted used 1 year ago    Sexual activity: Yes     Partners: Female     Birth control/ protection: Condom     Other Topics Concern    Not on file     Social History Narrative      FAMILY HISTORY: History reviewed. No pertinent family history. Family History   Problem Relation Age of Onset    Malignant Hyperthermia Neg Hx     Pseudocholinesterase Deficiency Neg Hx     Delayed Awakening Neg Hx     Post-op Nausea/Vomiting Neg Hx     Emergence Delirium Neg Hx     Post-op Cognitive Dysfunction Neg Hx     Other Neg Hx        REVIEW OF SYSTEMS:   History obtained from the patient  Pertinent items are noted in the History of Present Illness. All other Systems reviewed and are considered negative. MENTAL STATUS EXAM & VITALS     MENTAL STATUS EXAM (MSE):    MSE FINDINGS ARE WITHIN NORMAL LIMITS (WNL) UNLESS OTHERWISE STATED BELOW. ( ALL OF THE BELOW CATEGORIES OF THE MSE HAVE BEEN REVIEWED (reviewed 12/13/2017) AND UPDATED AS DEEMED APPROPRIATE )  General Presentation age appropriate, cooperative   Orientation oriented to time, place and person   Vital Signs  See below (reviewed 12/13/2017); Vital Signs (BP, Pulse, & Temp) are within normal limits if not listed below.    Gait and Station Stable/steady, no ataxia   Musculoskeletal System No extrapyramidal symptoms (EPS); no abnormal muscular movements or Tardive Dyskinesia (TD); muscle strength and tone are within normal limits   Language No aphasia or dysarthria   Speech:  normal pitch and normal volume   Thought Processes logical; normal rate of thoughts; good abstract reasoning/computation   Thought Associations goal directed   Thought Content free of delusions   Suicidal Ideations no plan  and no intention Homicidal Ideations no plan  and no intention   Mood:  anxious    Affect:  anxious   Memory recent  good   Memory remote:  good   Concentration/Attention:  good   Fund of Knowledge average   Insight:  good   Reliability fair   Judgment:  fair          VITALS:     Patient Vitals for the past 24 hrs:   Temp Pulse Resp BP SpO2   12/13/17 0737 - 60 - (!) 83/60 -   12/13/17 0630 - - 20 92/52 -   12/13/17 0530 97 °F (36.1 °C) (!) 56 20 (!) 82/46 -   12/13/17 0058 97.3 °F (36.3 °C) 65 18 99/64 -   12/12/17 2332 - 67 16 111/76 100 %   12/12/17 2317 98.3 °F (36.8 °C) 76 16 (!) 131/96 100 %   12/12/17 2158 98.2 °F (36.8 °C) 67 20 132/69 100 %   12/12/17 2119 98.7 °F (37.1 °C) (!) 105 18 149/71 100 %     Wt Readings from Last 3 Encounters:   12/12/17 59.9 kg (132 lb)   06/22/17 68.9 kg (152 lb)   09/15/16 63.5 kg (140 lb)     Temp Readings from Last 3 Encounters:   12/13/17 97 °F (36.1 °C)   06/22/17 98.5 °F (36.9 °C)   09/15/16 98.5 °F (36.9 °C)     BP Readings from Last 3 Encounters:   12/13/17 (!) 83/60   06/22/17 132/88   09/15/16 128/81     Pulse Readings from Last 3 Encounters:   12/13/17 60   06/22/17 76   09/15/16 95            DATA     LABORATORY DATA:  Labs Reviewed   CBC WITH AUTOMATED DIFF - Abnormal; Notable for the following:        Result Value    NEUTROPHILS 76 (*)     MONOCYTES 3 (*)     All other components within normal limits   URINALYSIS W/ RFLX MICROSCOPIC - Abnormal; Notable for the following:     Appearance CLOUDY (*)     All other components within normal limits   METABOLIC PANEL, BASIC - Abnormal; Notable for the following:     Creatinine 1.15 (*)     BUN/Creatinine ratio 10 (*)     GFR est non-AA 54 (*)     All other components within normal limits   DRUG SCREEN, URINE - Abnormal; Notable for the following:     COCAINE POSITIVE (*)     METHADONE POSITIVE (*)     OPIATES POSITIVE (*)     THC (TH-CANNABINOL) POSITIVE (*)     All other components within normal limits   ACETAMINOPHEN - Abnormal; Notable for the following:     Acetaminophen level <2 (*)     All other components within normal limits   ETHYL ALCOHOL   SALICYLATE   LIPID PANEL   TSH 3RD GENERATION   HCG URINE, QL. - POC     Admission on 12/12/2017   Component Date Value Ref Range Status    WBC 12/12/2017 9.7  3.6 - 11.0 K/uL Final    RBC 12/12/2017 5.17  3.80 - 5.20 M/uL Final    HGB 12/12/2017 13.8  11.5 - 16.0 g/dL Final    HCT 12/12/2017 43.2  35.0 - 47.0 % Final    MCV 12/12/2017 83.6  80.0 - 99.0 FL Final    MCH 12/12/2017 26.7  26.0 - 34.0 PG Final    MCHC 12/12/2017 31.9  30.0 - 36.5 g/dL Final    RDW 12/12/2017 14.4  11.5 - 14.5 % Final    PLATELET 12/29/1256 764  150 - 400 K/uL Final    NEUTROPHILS 12/12/2017 76* 32 - 75 % Final    LYMPHOCYTES 12/12/2017 20  12 - 49 % Final    MONOCYTES 12/12/2017 3* 5 - 13 % Final    EOSINOPHILS 12/12/2017 1  0 - 7 % Final    BASOPHILS 12/12/2017 0  0 - 1 % Final    ABS. NEUTROPHILS 12/12/2017 7.4  1.8 - 8.0 K/UL Final    ABS. LYMPHOCYTES 12/12/2017 2.0  0.8 - 3.5 K/UL Final    ABS. MONOCYTES 12/12/2017 0.3  0.0 - 1.0 K/UL Final    ABS. EOSINOPHILS 12/12/2017 0.1  0.0 - 0.4 K/UL Final    ABS.  BASOPHILS 12/12/2017 0.0  0.0 - 0.1 K/UL Final    Color 12/12/2017 YELLOW/STRAW    Final    Appearance 12/12/2017 CLOUDY* CLEAR   Final    Specific gravity 12/12/2017 1.030  1.003 - 1.030   Final    pH (UA) 12/12/2017 6.5  5.0 - 8.0   Final    Protein 12/12/2017 NEGATIVE   NEG mg/dL Final    Glucose 12/12/2017 NEGATIVE   NEG mg/dL Final    Ketone 12/12/2017 NEGATIVE   NEG mg/dL Final    Bilirubin 12/12/2017 NEGATIVE   NEG   Final    Blood 12/12/2017 NEGATIVE   NEG   Final    Urobilinogen 12/12/2017 1.0  0.2 - 1.0 EU/dL Final    Nitrites 12/12/2017 NEGATIVE   NEG   Final    Leukocyte Esterase 12/12/2017 NEGATIVE   NEG   Final    Sodium 12/12/2017 140  136 - 145 mmol/L Final    Potassium 12/12/2017 3.5  3.5 - 5.1 mmol/L Final    Chloride 12/12/2017 104  97 - 108 mmol/L Final    CO2 12/12/2017 29  21 - 32 mmol/L Final    Anion gap 12/12/2017 7  5 - 15 mmol/L Final    Glucose 12/12/2017 93  65 - 100 mg/dL Final    BUN 12/12/2017 11  6 - 20 MG/DL Final    Creatinine 12/12/2017 1.15* 0.55 - 1.02 MG/DL Final    BUN/Creatinine ratio 12/12/2017 10* 12 - 20   Final    GFR est AA 12/12/2017 >60  >60 ml/min/1.73m2 Final    GFR est non-AA 12/12/2017 54* >60 ml/min/1.73m2 Final    Calcium 12/12/2017 9.2  8.5 - 10.1 MG/DL Final    AMPHETAMINES 12/12/2017 NEGATIVE   NEG   Final    BARBITURATES 12/12/2017 NEGATIVE   NEG   Final    BENZODIAZEPINES 12/12/2017 NEGATIVE   NEG   Final    COCAINE 12/12/2017 POSITIVE* NEG   Final    METHADONE 12/12/2017 POSITIVE* NEG   Final    OPIATES 12/12/2017 POSITIVE* NEG   Final    PCP(PHENCYCLIDINE) 12/12/2017 NEGATIVE   NEG   Final    THC (TH-CANNABINOL) 12/12/2017 POSITIVE* NEG   Final    Drug screen comment 12/12/2017 (NOTE)   Final    ALCOHOL(ETHYL),SERUM 12/12/2017 <10  <10 MG/DL Final    Acetaminophen level 12/12/2017 <2* 10 - 30 ug/mL Final    Salicylate level 26/12/2471 2.8  2.8 - 20.0 MG/DL Final    Pregnancy test,urine (POC) 12/12/2017 NEGATIVE   NEG   Final    LIPID PROFILE 12/13/2017        Final    Cholesterol, total 12/13/2017 128  <200 MG/DL Final    Triglyceride 12/13/2017 84  <150 MG/DL Final    HDL Cholesterol 12/13/2017 56  MG/DL Final    LDL, calculated 12/13/2017 55.2  0 - 100 MG/DL Final    VLDL, calculated 12/13/2017 16.8  MG/DL Final    CHOL/HDL Ratio 12/13/2017 2.3  0 - 5.0   Final    TSH 12/13/2017 1.21  0.36 - 3.74 uIU/mL Final        RADIOLOGY REPORTS:    Results from Hospital Encounter encounter on 06/26/13   XR 2ND TOE LT MIN 2 V   Narrative **Final Report**      ICD Codes / Adm. Diagnosis: 802167  626.0 / Toe Pain    Examination:  CR TOE 2ND MIN 2 VWS LT  - [id number] - Jun 26 2013  6:59AM  Accession No:  [id number]  Reason:  trauma 10 days ago      REPORT:  EXAM: CR TOE 2ND MIN 2 VWS LT    INDICATION:   trauma 10 days ago    COMPARISON: None. FINDINGS:  Three views of the left second toe demonstrates no fracture   dislocation or other osseous abnormality. IMPRESSION:      Normal left second toe . Signing/Reading Doctor: Jesús Cunningham ([id number])    Approved: Jesús Cunningham ([id number])  Jun 26 2013  7:08AM                             No results found.            MEDICATIONS       ALL MEDICATIONS  Current Facility-Administered Medications   Medication Dose Route Frequency    influenza vaccine 2017-18 (3 yrs+)(PF) (FLUZONE QUAD/FLUARIX QUAD) injection 0.5 mL  0.5 mL IntraMUSCular PRIOR TO DISCHARGE    ziprasidone (GEODON) 20 mg in sterile water (preservative free) 1 mL injection  20 mg IntraMUSCular BID PRN    OLANZapine (ZyPREXA) tablet 5 mg  5 mg Oral Q6H PRN    benztropine (COGENTIN) tablet 2 mg  2 mg Oral BID PRN    benztropine (COGENTIN) injection 2 mg  2 mg IntraMUSCular BID PRN    LORazepam (ATIVAN) injection 2 mg  2 mg IntraMUSCular Q4H PRN    LORazepam (ATIVAN) tablet 1 mg  1 mg Oral Q4H PRN    zolpidem (AMBIEN) tablet 10 mg  10 mg Oral QHS PRN    acetaminophen (TYLENOL) tablet 650 mg  650 mg Oral Q4H PRN    ibuprofen (MOTRIN) tablet 400 mg  400 mg Oral Q8H PRN    magnesium hydroxide (MILK OF MAGNESIA) 400 mg/5 mL oral suspension 30 mL  30 mL Oral DAILY PRN    nicotine (NICODERM CQ) 21 mg/24 hr patch 1 Patch  1 Patch TransDERmal DAILY PRN      SCHEDULED MEDICATIONS  Current Facility-Administered Medications   Medication Dose Route Frequency    influenza vaccine 2017-18 (3 yrs+)(PF) (FLUZONE QUAD/FLUARIX QUAD) injection 0.5 mL  0.5 mL IntraMUSCular PRIOR TO DISCHARGE                ASSESSMENT & PLAN        The patient, Aydee Flores, is a 34 y.o.  female who presents at this time for treatment of the following diagnoses:  Patient Active Hospital Problem List:   Bipolar disorder ([facility] [address] (12/12/2017)    Assessment: depression     Plan: need collateral information    Substance induced mood disorder ([facility] [address] (12/12/2017)    Assessment: Opiate abuse     Plan: opiate detox           I will continue to monitor blood levels (Depakote, Tegretol, lithium, clozapine---a drug with a narrow therapeutic index= NTI) and associated labs for drug therapy implemented that require intense monitoring for toxicity as deemed appropriate based on current medication side effects and pharmacodynamically determined drug 1/2 lives. A coordinated, multidisplinary treatment team (includes the nurse, unit pharmcist,  and writer) round was conducted for this initial evaluation with the patient present. The following regarding medications was addressed during rounds with patient:   the risks and benefits of the proposed medication. The patient was given the opportunity to ask questions. Informed consent given to the use of the above medications. I will continue to adjust psychiatric and non-psychiatric medications (see above \"medication\" section and orders section for details) as deemed appropriate & based upon diagnoses and response to treatment. I have reviewed admission (and previous/old) labs and medical tests in the EHR and or transferring hospital documents. I will continue to order blood tests/labs and diagnostic tests as deemed appropriate and review results as they become available (see orders for details). I have reviewed old psychiatric and medical records available in the EHR. I Will order additional psychiatric records from other institutions to further elucidate the nature of patient's psychopathology and review once available. I will gather additional collateral information from friends, family and o/p treatment team to further elucidate the nature of patient's psychopathology and baselline level of psychiatric functioning.       ESTIMATED LENGTH OF STAY:    3       STRENGTHS:  Exercising self-direction/Resourceful, Access to housing/residential stability and Interpersonal/supportive relationships (family, friends, peers)                                        SIGNED:    Laly Weiss MD  12/13/2017

## 2017-12-13 NOTE — BH NOTES
Patient initiated interaction with staff and peers. Became upset after making phone call. Some usage of abusive language to a staff member noted. Patient much calmer after medication was given. Will continue to monitor with Q15 Checks.

## 2017-12-13 NOTE — BH NOTES
GROUP THERAPY PROGRESS NOTE    The patient [employer] 34 y.o. female is participating in Coping Skills Group. Group time: 45 minutes    Personal goal for participation: To participate in festive relaxation activity    Goal orientation:  personal     Group therapy participation: active    Therapeutic interventions reviewed and discussed: favorite ways to relax    Impression of participation:  The patient was attentive.     Alejandro Meyer  12/13/2017  2:15 PM

## 2017-12-13 NOTE — ED NOTES
Pt reports that son's father is trying to take baby away. Pt states, \"everyone is against me. They are trying to take my kids away from me. \"  Pt reports that youngest child's father \"didn't even want him. They left me out in the cold. \"   Pt states that she has not family to support her.
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
## 2017-12-13 NOTE — ED NOTES
Pt presents ambulatory to ED complaining of generalized pain associated with withdrawals. Pt states, \"I'm going through withdrawals. I'm hurting all over. I got kicked out of my methadone program yesterday because of my insurance. \"  Pt reports that she has not missed any doses and takes 40 mg of Methadone daily, with her last dose being yesterday. Pt reports a decreased appetite, denies n/v.  Pt states, \"Ms Saint Filler told me to come in.\"  Pt is alert and oriented x 4, RR even and unlabored, skin is warm and dry. Assesment completed and pt updated on plan of care. Emergency Department Nursing Plan of Care       The Nursing Plan of Care is developed from the Nursing assessment and Emergency Department Attending provider initial evaluation. The plan of care may be reviewed in the ED Provider note.     The Plan of Care was developed with the following considerations:   Patient / Family readiness to learn indicated by:verbalized understanding  Persons(s) to be included in education: patient  Barriers to Learning/Limitations:No    Signed     Jayesh Jacobo RN    12/12/2017   10:20 PM

## 2017-12-13 NOTE — ED NOTES
TRANSFER - OUT REPORT:    Verbal report given to Nurse Barbara Pal on Lee Osler  being transferred to behavioral health for routine progression of care       Report consisted of patients Situation, Background, Assessment and   Recommendations(SBAR). Information from the following report(s) SBAR, ED Summary, Intake/Output, MAR and Recent Results was reviewed with the receiving nurse. Lines:       Opportunity for questions and clarification was provided.       Patient transported with:  Belongings by security

## 2017-12-13 NOTE — BH NOTES
GROUP THERAPY PROGRESS NOTE    The patient Juan kenyon 34 y.o. female is participating in Creative Expression Group. Group time: 1 hour    Personal goal for participation: To concentrate on selected task    Goal orientation: social    Group therapy participation: active    Therapeutic interventions reviewed and discussed: Crafts, games, music    Impression of participation: The patient was attentive.     Jono Ruano  12/13/2017  5:55 PM

## 2017-12-13 NOTE — BH NOTES
TRANSFER - IN REPORT:    Bryson Estrada  being received from [facility] ED for routine progression of care      Assessment completed upon patients arrival to unit and care assumed. Patient states she was told on Tuesday morning that methadone clinic would no longer be providing her with methadone due to Medicaid not paying at this time. She last took methadone on 12/11 a.m. States she used heroine the same day. She is depressed and states she is experiencing withdrawal. She denies SI/HI and verbally contracts for safety. VSS after receiving Clonidine in the ED. Safety search completed with STACEY Avery revealing skin that is dry and intact. With scar on L breast and few scattered tattoos. No contraband noted. Patient was oriented to unit and is currently resting in bed. Dr. Bravo Kaufman notified of presentation and orders received.
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
## 2017-12-13 NOTE — BH NOTES
Orthostatic BP's were obtained prior to patient getting up this morning, along with a manual BP. BP remained low. Patient denied dizziness upon sitting up and standing. Patient advised to eat breakfast and to continue to drink fluids. Will continue to monitor BP.

## 2017-12-13 NOTE — BH NOTES
The patient is quiet and cooperative. she has a flat effect. She is visible on the unit and she is attending the group. The patient has been encouraged to communicate any concerns or problems to the staff.monitoring for safety Q 15 minutes.

## 2017-12-13 NOTE — BH NOTES
The patients key was found in her pocket in clothes being washed by Sameul Cough the chi is in an envelope on the inside of patients chart.

## 2017-12-14 PROCEDURE — 74011250636 HC RX REV CODE- 250/636: Performed by: PSYCHIATRY & NEUROLOGY

## 2017-12-14 PROCEDURE — 74011250637 HC RX REV CODE- 250/637: Performed by: PSYCHIATRY & NEUROLOGY

## 2017-12-14 PROCEDURE — 65220000003 HC RM SEMIPRIVATE PSYCH

## 2017-12-14 PROCEDURE — 93005 ELECTROCARDIOGRAM TRACING: CPT

## 2017-12-14 RX ORDER — PROMETHAZINE HYDROCHLORIDE 25 MG/ML
25 INJECTION, SOLUTION INTRAMUSCULAR; INTRAVENOUS
Status: DISCONTINUED | OUTPATIENT
Start: 2017-12-14 | End: 2017-12-15 | Stop reason: HOSPADM

## 2017-12-14 RX ORDER — PROMETHAZINE HYDROCHLORIDE 25 MG/1
25 TABLET ORAL
Status: DISCONTINUED | OUTPATIENT
Start: 2017-12-14 | End: 2017-12-15 | Stop reason: HOSPADM

## 2017-12-14 RX ORDER — ADHESIVE BANDAGE
30 BANDAGE TOPICAL DAILY PRN
Status: DISCONTINUED | OUTPATIENT
Start: 2017-12-14 | End: 2017-12-14 | Stop reason: SDUPTHER

## 2017-12-14 RX ORDER — METHADONE HYDROCHLORIDE 10 MG/1
30 TABLET ORAL ONCE
Status: DISCONTINUED | OUTPATIENT
Start: 2017-12-14 | End: 2017-12-14

## 2017-12-14 RX ORDER — DICYCLOMINE HYDROCHLORIDE 10 MG/ML
20 INJECTION INTRAMUSCULAR
Status: DISCONTINUED | OUTPATIENT
Start: 2017-12-14 | End: 2017-12-15 | Stop reason: HOSPADM

## 2017-12-14 RX ORDER — LOPERAMIDE HYDROCHLORIDE 2 MG/1
2 CAPSULE ORAL AS NEEDED
Status: DISCONTINUED | OUTPATIENT
Start: 2017-12-14 | End: 2017-12-15 | Stop reason: HOSPADM

## 2017-12-14 RX ORDER — HYDROXYZINE PAMOATE 25 MG/1
50 CAPSULE ORAL
Status: DISCONTINUED | OUTPATIENT
Start: 2017-12-14 | End: 2017-12-15 | Stop reason: HOSPADM

## 2017-12-14 RX ADMIN — ZOLPIDEM TARTRATE 10 MG: 10 TABLET, FILM COATED ORAL at 21:36

## 2017-12-14 RX ADMIN — DICYCLOMINE HYDROCHLORIDE 20 MG: 20 INJECTION, SOLUTION INTRAMUSCULAR at 15:08

## 2017-12-14 NOTE — PROGRESS NOTES
Problem: Chemical Dependency (Adult/Pediatric)  Goal: *STG: Seeks staff when symptoms of withdrawal increase  Outcome: Progressing Towards Goal  Patient's COW score this morning was one. Patient was noted to have mild anxiety. Patient reported sweating, but none was observed or palpated. Writer reminded patient of conversation we had yesterday, in that we spoke of how the withdrawal program works; I did medication teaching. We went over how methadone works when used for withdrawal. I also explained to patient that everyone does not go through withdrawals the same. We had also discussed her family. Patient felt that her mother had turned her back on her, even though she was taking care of one of her children. We discussed her mother actions and patient decided that her mother was supporting her and accepted that perhaps was now incorporating tough love tactics. Patient stated today she wanted to go home because she was being treated for her withdrawals. Writer explained to patient that her withdrawal symptoms were mild and that what she may be experiencing now is craving. I asked her what may happen if she leaves now. Patient stated she would probably use and agreed to stay. Plan; continue to encourage patient to think through the positives and negatives of using; to remind her of important priorities in life.

## 2017-12-14 NOTE — BH NOTES
The patient Lee Osler is participating in Relaxation Group. Group time: 30 minutes    Personal goal for participation: personal  Goal orientation:  To learn to relax    Group therapy participation: Active    Therapeutic interventions reviewed and discussed: Yes    Lum Phalen  12/13/2017

## 2017-12-14 NOTE — BH NOTES
GROUP THERAPY PROGRESS NOTE    The patient Carito Dallas is participating in Reflection Group. Group time: 30 minutes    Personal goal for participation:  To reflect on today's occurences    Goal orientation: Reflection    Group therapy participation: Active    Therapeutic interventions reviewed and discussed: Yes    Robinson Ribeiro  12/13/2017

## 2017-12-14 NOTE — PROGRESS NOTES
Problem: Chemical Dependency (Adult/Pediatric)  Goal: *STG: Remains safe in hospital  Outcome: Progressing Towards Goal  Pt slept 7 hours. Pt had uneventful night and required no PRN's. Pt had no complaints or signs of distress throughout night. Respirations were unlabored. Continuing to monitor with q15 min rounds for safety.

## 2017-12-14 NOTE — BH NOTES
PSYCHIATRIC PROGRESS NOTE         Patient Name  Eusebio Guerra   Date of Birth 1985   [facility] [id number]   Medical Record Number  [id number]      Age  34 y.o. PCP Elodai Eastman MD   Admit date:  12/12/2017    Room Number  324/02  @ [facility]   Date of Service  12/14/2017          PSYCHOTHERAPY SESSION NOTE:  Length of psychotherapy session: 15 minutes    Main condition/diagnosis/issues treated during session today, 12/14/2017 : substance use, w/d, mmedication    I employed Cognitive Behavioral therapy techniques, Reality-Oriented psychotherapy, as well as supportive psychotherapy in regards to various ongoing psychosocial stressors, including the following: pre-admission and current problems; housing issues; occupational issues; medical issues; and stress of hospitalization. Interpersonal relationship issues and psychodynamic conflicts explored. Attempts made to alleviate maladaptive patterns. We, also, worked on issues of denial & effects of substance dependency/use     Overall, patient is not progressing    Treatment Plan Update (reviewed an updated 12/14/2017) : I will modify psychotherapy tx plan by implementing more stress management strategies, building upon cognitive behavioral techniques, increasing coping skills, as well as shoring up psychological defenses). An extended energy and skill set was needed to engage pt in psychotherapy due to some of the following: resistiveness, complexity, negativity, confrontational nature, hostile behaviors, and/or severe abnormalities in thought processes/psychosis resulting in the loss of expressive/receptive language communication skills. E & M PROGRESS NOTE:         HISTORY       CC:  \"not feeling good\"  HISTORY OF PRESENT ILLNESS/INTERVAL HISTORY:  (reviewed/updated 12/14/2017). per initial evaluation: The patient, Eusebio Guerra, is a 34 y.o.   BLACK OR  female with a past psychiatric history significant for opiate withdrawal , who presents at this time with complaints of (and/or evidence of) the following emotional symptoms: depression. Additional symptomatology include anxiety and anxiety attacks. The above symptoms have been present for weeks . These symptoms are of moderate severity. These symptoms are constant  in nature. The patient's condition has been precipitated by leaving Methadone program and psychosocial stressors (unemplyed  ). Patient's condition made worse by continued illicit drug use treatment noncompliance. UDS: positive  BAL=0. Helen Araiza presents/reports/evidences the following emotional symptoms today, 2017:depression. The above symptoms have been present for few week. These symptoms are of moderate severity. The symptoms are intermittent/ fleeting in nature. Additional symptomatology and features include anxiety, mild w/d sx. Pt is preoccupied with receiving Methadone, denies SI/HI/AVH. SIDE EFFECTS: (reviewed/updated 2017)  None reported or admitted to. No noted toxicity with use of current med   ALLERGIES:(reviewed/updated 2017)  Allergies   Allergen Reactions    Tramadol Rash      MEDICATIONS PRIOR TO ADMISSION:(reviewed/updated 2017)  No prescriptions prior to admission. PAST MEDICAL HISTORY: Past medical history from the initial psychiatric evaluation has been reviewed (reviewed/updated 2017) with no additional updates (I asked patient and no additional past medical history provided).    Past Medical History:   Diagnosis Date    Depressive disorder, not elsewhere classified 2014     Past Surgical History:   Procedure Laterality Date    BREAST SURGERY PROCEDURE UNLISTED      cyst removal    HX GYN          HX OTHER SURGICAL      fibroid removal left breast      SOCIAL HISTORY: Social history from the initial psychiatric evaluation has been reviewed (reviewed/updated 2017) with no additional updates (I asked patient and no additional social history provided). Social History     Social History    Marital status: SINGLE     Spouse name: N/A    Number of children: N/A    Years of education: N/A     Occupational History    Not on file. Social History Main Topics    Smoking status: Current Every Day Smoker     Packs/day: 0.50     Years: 10.00    Smokeless tobacco: Current User    Alcohol use Yes      Comment: socially    Drug use: Yes     Special: Heroin, Marijuana, Cocaine      Comment: lasted used 1 year ago    Sexual activity: Yes     Partners: Female     Birth control/ protection: Condom     Other Topics Concern    Not on file     Social History Narrative      FAMILY HISTORY: Family history from the initial psychiatric evaluation has been reviewed (reviewed/updated 12/14/2017) with no additional updates (I asked patient and no additional family history provided).    Family History   Problem Relation Age of Onset    Malignant Hyperthermia Neg Hx     Pseudocholinesterase Deficiency Neg Hx     Delayed Awakening Neg Hx     Post-op Nausea/Vomiting Neg Hx     Emergence Delirium Neg Hx     Post-op Cognitive Dysfunction Neg Hx     Other Neg Hx        REVIEW OF SYSTEMS: (reviewed/updated 12/14/2017)  Appetite:no change from normal   Sleep: fitful   All other Review of Systems: Psychological ROS: positive for - anxiety and behavioral disorder  Respiratory ROS: no cough, shortness of breath, or wheezing  Cardiovascular ROS: no chest pain or dyspnea on exertion  Gastrointestinal ROS: positive for - diarrhea  negative for - blood in stools, hematemesis or stool incontinence  Neurological ROS: no TIA or stroke symptoms         [facility] [place] [place]    MSE FINDINGS ARE WITHIN NORMAL LIMITS (WNL) UNLESS OTHERWISE STATED BELOW. ( ALL OF THE BELOW CATEGORIES OF THE MSE HAVE BEEN REVIEWED (reviewed 12/14/2017) AND UPDATED AS DEEMED APPROPRIATE )  General Presentation age appropriate and casually dressed, cooperative and unreliable   Orientation oriented to time, place and person   Vital Signs  See below (reviewed 12/14/2017); Vital Signs (BP, Pulse, & Temp) are within normal limits if not listed below.    Gait and Station Stable/steady, no ataxia   Musculoskeletal System No extrapyramidal symptoms (EPS); no abnormal muscular movements or Tardive Dyskinesia (TD); muscle strength and tone are within normal limits   Language No aphasia or dysarthria   Speech:  monotone   Thought Processes logical; slow rate of thoughts; fair abstract reasoning/computation   Thought Associations goal directed   Thought Content free of delusions, free of hallucinations and preoccupations   Suicidal Ideations none   Homicidal Ideations none   Mood:  anxious    Affect:  anxious, irritable and mood-congruent   Memory recent  intact   Memory remote:  intact   Concentration/Attention:  intact   Fund of Knowledge average   Insight:  limited   Reliability poor   Judgment:  poor          VITALS:     Patient Vitals for the past 24 hrs:   Temp Pulse Resp BP   12/14/17 0600 97 °F (36.1 °C) (!) 57 16 103/55   12/13/17 1932 98 °F (36.7 °C) 64 16 96/54     Wt Readings from Last 3 Encounters:   12/12/17 59.9 kg (132 lb)   06/22/17 68.9 kg (152 lb)   09/15/16 63.5 kg (140 lb)     Temp Readings from Last 3 Encounters:   12/14/17 97 °F (36.1 °C)   06/22/17 98.5 °F (36.9 °C)   09/15/16 98.5 °F (36.9 °C)     BP Readings from Last 3 Encounters:   12/14/17 103/55   06/22/17 132/88   09/15/16 128/81     Pulse Readings from Last 3 Encounters:   12/14/17 (!) 57   06/22/17 76   09/15/16 95            DATA     LABORATORY DATA:(reviewed/updated 12/14/2017)  Recent Results (from the past 24 hour(s))   EKG, 12 LEAD, INITIAL    Collection Time: 12/14/17 11:46 AM   Result Value Ref Range    Ventricular Rate 56 BPM    Atrial Rate 56 BPM    P-R Interval 150 ms    QRS Duration 82 ms    Q-T Interval 414 ms    QTC Calculation (Bezet) 399 ms Calculated P Axis 42 degrees    Calculated R Axis 70 degrees    Calculated T Axis 76 degrees    Diagnosis       Sinus bradycardia  Otherwise normal ECG  When compared with ECG of 02-SEP-2009 17:48,  Vent. rate has decreased BY  30 BPM       No results found for: VALF2, VALAC, VALP, VALPR, DS6, CRBAM, CRBAMP, CARB2, XCRBAM  No results found for: LITHM   RADIOLOGY REPORTS:(reviewed/updated 12/14/2017)  No results found.        MEDICATIONS     ALL MEDICATIONS:   Current Facility-Administered Medications   Medication Dose Route Frequency    promethazine (PHENERGAN) tablet 25 mg  25 mg Oral Q6H PRN    promethazine (PHENERGAN) injection 25 mg  25 mg IntraMUSCular Q6H PRN    loperamide (IMODIUM) capsule 2 mg  2 mg Oral PRN    dicyclomine (BENTYL) 10 mg/mL injection 20 mg  20 mg IntraMUSCular Q6H PRN    ziprasidone (GEODON) 20 mg in sterile water (preservative free) 1 mL injection  20 mg IntraMUSCular BID PRN    OLANZapine (ZyPREXA) tablet 5 mg  5 mg Oral Q6H PRN    benztropine (COGENTIN) tablet 2 mg  2 mg Oral BID PRN    benztropine (COGENTIN) injection 2 mg  2 mg IntraMUSCular BID PRN    zolpidem (AMBIEN) tablet 10 mg  10 mg Oral QHS PRN    acetaminophen (TYLENOL) tablet 650 mg  650 mg Oral Q4H PRN    ibuprofen (MOTRIN) tablet 400 mg  400 mg Oral Q8H PRN    magnesium hydroxide (MILK OF MAGNESIA) 400 mg/5 mL oral suspension 30 mL  30 mL Oral DAILY PRN    nicotine (NICODERM CQ) 21 mg/24 hr patch 1 Patch  1 Patch TransDERmal DAILY PRN      SCHEDULED MEDICATIONS:   Current Facility-Administered Medications   Medication Dose Route Frequency          ASSESSMENT & PLAN     DIAGNOSES REQUIRING ACTIVE TREATMENT AND MONITORING: (reviewed/updated 12/14/2017)  Patient Active Hospital Problem List:   Bipolar disorder ([facility] (12/12/2017)    Assessment: depression     Plan: need collateral information - likely substance induced   Substance induced mood disorder ([facility] (12/12/2017)    Assessment: Opiate abuse     Plan: opiate detox protocol, EKG- sig bradycardia, QTc wnl, hypotension. Will prefer not to use Methadone as her w/d sx are mild- prn med. rehab      In summary, Britta Jauregui, is a 34 y.o.  female who presents with a severe exacerbation of the principal diagnosis of <principal problem not specified>  Patient's condition is worsening/not improving/not stable   Patient requires continued inpatient hospitalization for further stabilization, safety monitoring and medication management. I will continue to coordinate the provision of individual, milieu, occupational, group, and substance abuse therapies to address target symptoms/diagnoses as deemed appropriate for the individual patient. A coordinated, multidisplinary treatment team round was conducted with the patient (this team consists of the nurse, psychiatric unit pharmcist,  and writer). Complete current electronic health record for patient has been reviewed today including consultant notes, ancillary staff notes, nurses and psychiatric tech notes. Suicide risk assessment completed and patient deemed to be of low risk for suicide at this time. The following regarding medications was addressed during rounds with patient:   the risks and benefits of the proposed medication. The patient was given the opportunity to ask questions. Informed consent given to the use of the above medications. Will continue to adjust psychiatric and non-psychiatric medications (see above \"medication\" section and orders section for details) as deemed appropriate & based upon diagnoses and response to treatment. I will continue to order blood tests/labs and diagnostic tests as deemed appropriate and review results as they become available (see orders for details and above listed lab/test results). I will order psychiatric records from previous [facility] hospitals to further elucidate the nature of patient's psychopathology and review once available.     I will gather additional collateral information from friends, family and o/p treatment team to further elucidate the nature of patient's psychopathology and baselline level of psychiatric functioning. I certify that this patient's inpatient psychiatric hospital services furnished since the previous certification were, and continue to be, required for treatment that could reasonably be expected to improve the patient's condition, or for diagnostic study, and that the patient continues to need, on a daily basis, active treatment furnished directly by or requiring the supervision of inpatient psychiatric facility personnel. In addition, the hospital records show that services furnished were intensive treatment services, admission or related services, or equivalent services.     EXPECTED DISCHARGE DATE/DAY: TBD     DISPOSITION: Home       Signed By:   Janeth Gonzalez MD  12/14/2017

## 2017-12-14 NOTE — BH NOTES
GROUP THERAPY PROGRESS NOTE    The patient Elie kenyon 34 y.o. female is participating in Coping Skills Group. Group time: 45 minutes    Personal goal for participation: To participate in happiness game    Goal orientation:  personal    Group therapy participation: active    Therapeutic interventions reviewed and discussed: life scenarios exploring the pursuit of happiness    Impression of participation:  The patient was attentive.     Maricruz Vides  12/14/2017  2:28 PM

## 2017-12-14 NOTE — PROGRESS NOTES
Problem: Chemical Dependency (Adult/Pediatric)  Goal: *STG: Maintains appropriate nutrition and hydration  Outcome: Resolved/Met Date Met: 12/14/17  Patient has devonte drinking plenty of fluids and eating nearly 100% of meals. Patient has been attending groups and interacting with peers.

## 2017-12-14 NOTE — PROGRESS NOTES
md clark ekg, pt showering before test . Test explained to pt.  No further questions noted. ekg completed hard copy to Dr Anthony Ardon , ekg transmitted for reading,

## 2017-12-15 VITALS
HEART RATE: 56 BPM | DIASTOLIC BLOOD PRESSURE: 52 MMHG | HEIGHT: 64 IN | OXYGEN SATURATION: 100 % | TEMPERATURE: 97.4 F | BODY MASS INDEX: 22.53 KG/M2 | WEIGHT: 132 LBS | SYSTOLIC BLOOD PRESSURE: 95 MMHG | RESPIRATION RATE: 16 BRPM

## 2017-12-15 NOTE — BH NOTES
Pt was in a better mood this morning activity and attentive in community meeting this morning. Pt ate breakfast and lunch and received schedule medication from nurse. Pt had received a phone call that had made her a little upset. But staff comforted her to calm down and focus on the bigger picture which was getting home to her children. Pt was redirected to the day room to participate in group with  and peers.

## 2017-12-15 NOTE — BH NOTES
Pt visible on the unit in day room during visitation talking about select female nurse calling her names and verbally threaten her in front of peers and her visitor. Pt continue to call her names because she didn't get med's when she wanted them, continue to verbally threaten and use profanity. Pt continue call staff out their names, apologize and return to day room calling both staff members names. Pt angry and agitated even during her phone calls, pt also stated she be glad when she discharge so she can go and fix herself up. Pt stated she was tied of britches like select nurse looking down on people like her. Pt spent most of her time channeling her energy on this staff.

## 2017-12-15 NOTE — PROGRESS NOTES
Problem: Chemical Dependency (Adult/Pediatric)  Goal: *STG: Attends activities and groups  Outcome: Progressing Towards Goal  Patient is visible on the unit. Medication and meal complaint. monitored for withdrawal and being monitored by the cows scale. socializing with peers. Will continue to monitor patient and assess needs.

## 2017-12-15 NOTE — DISCHARGE SUMMARY
PSYCHIATRIC DISCHARGE SUMMARY         IDENTIFICATION:    Patient Name  Alejandra Camilo   Date of Birth 1985   [facility] [id number]   Medical Record Number  [id number]      Age  34 y.o. PCP Fly Jha MD   Admit date:  12/12/2017    Discharge date: 12/15/2017   Room Number  324/02  @ [facility]   Date of Service  12/15/2017            TYPE OF DISCHARGE: REGULAR               CONDITION AT DISCHARGE: improved       PROVISIONAL & DISCHARGE DIAGNOSES:    Problem List  Date Reviewed: 1/4/2014          Codes Class    Bipolar disorder ([facility] ICD-10-CM: F31.9  ICD-9-CM: 296.80         Substance induced mood disorder ([facility] ICD-10-CM: F19.94  ICD-9-CM: 292.84         Depressive disorder, not elsewhere classified ICD-10-CM: F32.9  ICD-9-CM: 808         Polysubstance dependence ([facility] ICD-10-CM: F19.20  ICD-9-CM: 304.80               Active Hospital Problems    Bipolar disorder ([facility]      Substance induced mood disorder ([facility]        DISCHARGE DIAGNOSIS:   Axis I:  SEE ABOVE  Axis II: SEE ABOVE  Axis III: SEE ABOVE  Axis IV:  lack of structure  Axis V:  34 on admission, 55 on discharge 60(baseline)       CC & HISTORY OF PRESENT ILLNESS:  CC:  \"not feeling good\"  HISTORY OF PRESENT ILLNESS/INTERVAL HISTORY:  (reviewed/updated 12/14/2017). per initial evaluation: The patient, Alejandra Camilo, is a 34 y.o.  BLACK OR  female with a past psychiatric history significant for opiate withdrawal , who presents at this time with complaints of (and/or evidence of) the following emotional symptoms: depression.  Additional symptomatology include anxiety and anxiety attacks.  The above symptoms have been present for weeks . These symptoms are of moderate severity. These symptoms are constant  in nature.  The patient's condition has been precipitated by leaving Methadone program and psychosocial stressors (unemplyed  ).  Patient's condition made worse by continued illicit drug use treatment noncompliance. UDS: positive  BAL=0.         Austin Núñez presents/reports/evidences the following emotional symptoms today, 12/14/2017:depression. The above symptoms have been present for few week. These symptoms are of moderate severity. The symptoms are intermittent/ fleeting in nature. Additional symptomatology and features include anxiety, mild w/d sx. Pt is preoccupied with receiving Methadone, denies SI/HI/AVH. 12/15/17 she is doing  Better and no anger issues and no self harm behavior and no acting out      SOCIAL HISTORY:    Social History     Social History    Marital status: SINGLE     Spouse name: N/A    Number of children: N/A    Years of education: N/A     Occupational History    Not on file. Social History Main Topics    Smoking status: Current Every Day Smoker     Packs/day: 0.50     Years: 10.00    Smokeless tobacco: Current User    Alcohol use Yes      Comment: socially    Drug use: Yes     Special: Heroin, Marijuana, Cocaine      Comment: lasted used 1 year ago    Sexual activity: Yes     Partners: Female     Birth control/ protection: Condom     Other Topics Concern    Not on file     Social History Narrative      FAMILY HISTORY:   Family History   Problem Relation Age of Onset    Malignant Hyperthermia Neg Hx     Pseudocholinesterase Deficiency Neg Hx     Delayed Awakening Neg Hx     Post-op Nausea/Vomiting Neg Hx     Emergence Delirium Neg Hx     Post-op Cognitive Dysfunction Neg Hx     Other Neg Hx              HOSPITALIZATION COURSE:    Austin Núñez was admitted to the inpatient psychiatric unit [facility] for acute psychiatric stabilization in regards to symptomatology as described in the HPI above. The differential diagnosis at time of admission included: Opiate dependence   While on the unit Austin Núñez was involved in individual, group, occupational and milieu therapy.   Psychiatric medications were adjusted during this hospitalization including Methadone Acosta Jaffe demonstrated a slow, but progressive improvement in overall condition. Much of patient's depression appeared to be related to situational stressors, effects of drugs of abuse, and psychological factors. Please see individual progress notes for more specific details regarding patient's hospitalization course. At time of discharge, Abdullahi Russo is without significant problems of depression psychosis  romaine. Patient free of suicidal and homicidal ideations (appears to be at very low risk of suicide or homicide) and reports many positive predictive factors in terms of not attempting suicide or homicide. Overall presentation at time of discharge is most consistent with the diagnosis of bipolar disorder adjustment disorder with depressed mood. Patient with request for discharge today. There are no grounds to seek a TDO. Patient has maximized benefit to be derived from acute inpatient psychiatric treatment. All members of the treatment team concur with each other in regards to plans for discharge today per patient's request.  Patient and family are aware and in agreement with discharge and discharge plan.     Per my last note:          LABS AND IMAGAING:    Labs Reviewed   CBC WITH AUTOMATED DIFF - Abnormal; Notable for the following:        Result Value    NEUTROPHILS 76 (*)     MONOCYTES 3 (*)     All other components within normal limits   URINALYSIS W/ RFLX MICROSCOPIC - Abnormal; Notable for the following:     Appearance CLOUDY (*)     All other components within normal limits   METABOLIC PANEL, BASIC - Abnormal; Notable for the following:     Creatinine 1.15 (*)     BUN/Creatinine ratio 10 (*)     GFR est non-AA 54 (*)     All other components within normal limits   DRUG SCREEN, URINE - Abnormal; Notable for the following:     COCAINE POSITIVE (*)     METHADONE POSITIVE (*)     OPIATES POSITIVE (*)     THC (TH-CANNABINOL) POSITIVE (*)     All other components within normal limits ACETAMINOPHEN - Abnormal; Notable for the following:     Acetaminophen level <2 (*)     All other components within normal limits   ETHYL ALCOHOL   SALICYLATE   LIPID PANEL   TSH 3RD GENERATION   METABOLIC PANEL, BASIC   HCG URINE, QL. - POC     No results found for: DS35, PHEN, PHENO, PHENT, DILF, DS39, PHENY, PTN, VALF2, VALAC, VALP, VALPR, DS6, CRBAM, CRBAMP, CARB2, XCRBAM  Admission on 12/12/2017   Component Date Value Ref Range Status    WBC 12/12/2017 9.7  3.6 - 11.0 K/uL Final    RBC 12/12/2017 5.17  3.80 - 5.20 M/uL Final    HGB 12/12/2017 13.8  11.5 - 16.0 g/dL Final    HCT 12/12/2017 43.2  35.0 - 47.0 % Final    MCV 12/12/2017 83.6  80.0 - 99.0 FL Final    MCH 12/12/2017 26.7  26.0 - 34.0 PG Final    MCHC 12/12/2017 31.9  30.0 - 36.5 g/dL Final    RDW 12/12/2017 14.4  11.5 - 14.5 % Final    PLATELET 64/37/3279 254  150 - 400 K/uL Final    NEUTROPHILS 12/12/2017 76* 32 - 75 % Final    LYMPHOCYTES 12/12/2017 20  12 - 49 % Final    MONOCYTES 12/12/2017 3* 5 - 13 % Final    EOSINOPHILS 12/12/2017 1  0 - 7 % Final    BASOPHILS 12/12/2017 0  0 - 1 % Final    ABS. NEUTROPHILS 12/12/2017 7.4  1.8 - 8.0 K/UL Final    ABS. LYMPHOCYTES 12/12/2017 2.0  0.8 - 3.5 K/UL Final    ABS. MONOCYTES 12/12/2017 0.3  0.0 - 1.0 K/UL Final    ABS. EOSINOPHILS 12/12/2017 0.1  0.0 - 0.4 K/UL Final    ABS.  BASOPHILS 12/12/2017 0.0  0.0 - 0.1 K/UL Final    Color 12/12/2017 YELLOW/STRAW    Final    Appearance 12/12/2017 CLOUDY* CLEAR   Final    Specific gravity 12/12/2017 1.030  1.003 - 1.030   Final    pH (UA) 12/12/2017 6.5  5.0 - 8.0   Final    Protein 12/12/2017 NEGATIVE   NEG mg/dL Final    Glucose 12/12/2017 NEGATIVE   NEG mg/dL Final    Ketone 12/12/2017 NEGATIVE   NEG mg/dL Final    Bilirubin 12/12/2017 NEGATIVE   NEG   Final    Blood 12/12/2017 NEGATIVE   NEG   Final    Urobilinogen 12/12/2017 1.0  0.2 - 1.0 EU/dL Final    Nitrites 12/12/2017 NEGATIVE   NEG   Final    Leukocyte Esterase 12/12/2017 NEGATIVE   NEG   Final    Sodium 12/12/2017 140  136 - 145 mmol/L Final    Potassium 12/12/2017 3.5  3.5 - 5.1 mmol/L Final    Chloride 12/12/2017 104  97 - 108 mmol/L Final    CO2 12/12/2017 29  21 - 32 mmol/L Final    Anion gap 12/12/2017 7  5 - 15 mmol/L Final    Glucose 12/12/2017 93  65 - 100 mg/dL Final    BUN 12/12/2017 11  6 - 20 MG/DL Final    Creatinine 12/12/2017 1.15* 0.55 - 1.02 MG/DL Final    BUN/Creatinine ratio 12/12/2017 10* 12 - 20   Final    GFR est AA 12/12/2017 >60  >60 ml/min/1.73m2 Final    GFR est non-AA 12/12/2017 54* >60 ml/min/1.73m2 Final    Calcium 12/12/2017 9.2  8.5 - 10.1 MG/DL Final    AMPHETAMINES 12/12/2017 NEGATIVE   NEG   Final    BARBITURATES 12/12/2017 NEGATIVE   NEG   Final    BENZODIAZEPINES 12/12/2017 NEGATIVE   NEG   Final    COCAINE 12/12/2017 POSITIVE* NEG   Final    METHADONE 12/12/2017 POSITIVE* NEG   Final    OPIATES 12/12/2017 POSITIVE* NEG   Final    PCP(PHENCYCLIDINE) 12/12/2017 NEGATIVE   NEG   Final    THC (TH-CANNABINOL) 12/12/2017 POSITIVE* NEG   Final    Drug screen comment 12/12/2017 (NOTE)   Final    ALCOHOL(ETHYL),SERUM 12/12/2017 <10  <10 MG/DL Final    Acetaminophen level 12/12/2017 <2* 10 - 30 ug/mL Final    Salicylate level 55/62/8238 2.8  2.8 - 20.0 MG/DL Final    Pregnancy test,urine (POC) 12/12/2017 NEGATIVE   NEG   Final    LIPID PROFILE 12/13/2017        Final    Cholesterol, total 12/13/2017 128  <200 MG/DL Final    Triglyceride 12/13/2017 84  <150 MG/DL Final    HDL Cholesterol 12/13/2017 56  MG/DL Final    LDL, calculated 12/13/2017 55.2  0 - 100 MG/DL Final    VLDL, calculated 12/13/2017 16.8  MG/DL Final    CHOL/HDL Ratio 12/13/2017 2.3  0 - 5.0   Final    TSH 12/13/2017 1.21  0.36 - 3.74 uIU/mL Final    Ventricular Rate 12/14/2017 56  BPM Preliminary    Atrial Rate 12/14/2017 56  BPM Preliminary    P-R Interval 12/14/2017 150  ms Preliminary    QRS Duration 12/14/2017 82  ms Preliminary    Q-T Interval 12/14/2017 414  ms Preliminary    QTC Calculation (Bezet) 12/14/2017 399  ms Preliminary    Calculated P Axis 12/14/2017 42  degrees Preliminary    Calculated R Axis 12/14/2017 70  degrees Preliminary    Calculated T Axis 12/14/2017 76  degrees Preliminary    Diagnosis 12/14/2017    Preliminary                    Value:Sinus bradycardia  Otherwise normal ECG  When compared with ECG of 02-SEP-2009 17:48,  Vent. rate has decreased BY  30 BPM       No results found. DISPOSITION:    Home. Patient to f/u with drug/etoh rehabilitation, psychiatric, and psychotherapy appointments. Patient is to f/u with internist as directed. FOLLOW-UP CARE:    Activity as tolerated  Regular Diet  Wound Care: none needed. Follow-up Information     Follow up With Details Comments [address] [place] [address]  [phone]                   PROGNOSIS:    Evelyn Ortega / ---- based on nature of patient's pathology/ies and treatment compliance issues. Prognosis is greatly dependent upon patient's ability to remain sober and to follow up with drug/etoh rehabilitation and psychiatric/psychotherapy appointments as well as to comply with psychiatric medications as prescribed. DISCHARGE MEDICATIONS: (no changes made). Informed consent given for the use of following psychotropic medications: There are no discharge medications for this patient. A coordinated, multidisplinary treatment team round was conducted with Sindhu Moreno is done daily here at [facility]. This team consists of the nurse, psychiatric unit pharmcist,  and writer. I have spent greater than 35 minutes on discharge work.     Signed:  Hannah Mendieta MD  12/15/2017

## 2017-12-15 NOTE — BH NOTES
Behavioral Health Transition Record to Provider    Patient Name: Carito Dallas  YOB: 1985  Medical Record Number: [id number]  Date of Admission: 12/12/2017  Date of Discharge: 12/15/2017    Attending Provider: Solomon Solares MD  Discharging Provider: Solomon Solares MD  To contact this individual call [phone] and ask the  to page. If unavailable, ask to be transferred to [facility] Provider on call. A Behavioral Health Provider will be available on call 24/7 and during holidays     Primary Care Provider: Michelle Bob MD    Allergies   Allergen Reactions    Tramadol Rash          H&P Summary Notes      H&P by Santino Rashid MD at 12/13/17 1851     Author:  Santino Rashid MD Service:  Internal Medicine Author Type:  Physician    Filed:  12/14/17 0815 Date of Service:  12/13/17 1851 Status:  Signed    :  Santino Rashid MD (Physician)           History and Physical    Subjective:     Carito Dallas is a 34 y. o.[SA1.1] with[SA1.2] past medical hx significant for substance abuse.[SA1.3]    Pt admitted under a voluntary basis for opiate withdrawal and depression proving to be a danger to self and others. Pt is asking for Methadone. She is maintained on Methadone in one of the local detox clinics. Pt is a 34 y.o. BLACK OR  female with a past psychiatric history significant for opiate withdrawal , who presents at this time with complaints of (and/or evidence of) the following emotional symptoms: depression. Additional symptomatology include anxiety and anxiety attacks. The above symptoms have been present for weeks . These symptoms are of moderate severity. These symptoms are constant  in nature. The patient's condition has been precipitated by leaving Methadone program and psychosocial stressors (unemplyed  ). Patient's condition made worse by continued illicit drug use treatment noncompliance. UDS: positive  BAL=0.  She is not feeling suicidal and no psychotic features andno manic symptoms . She is c/o abdominal pain due to withdrawal from opiate. She is asking for Methadone[SA1.4]        Past Medical History:   Diagnosis Date    Depressive disorder, not elsewhere classified 2014      Past Surgical History:   Procedure Laterality Date    BREAST SURGERY PROCEDURE UNLISTED      cyst removal    HX GYN          HX OTHER SURGICAL      fibroid removal left breast     Family History   Problem Relation Age of Onset    Malignant Hyperthermia Neg Hx     Pseudocholinesterase Deficiency Neg Hx     Delayed Awakening Neg Hx     Post-op Nausea/Vomiting Neg Hx     Emergence Delirium Neg Hx     Post-op Cognitive Dysfunction Neg Hx     Other Neg Hx       Social History   Substance Use Topics    Smoking status: Current Every Day Smoker     Packs/day: 0.50     Years: 10.00    Smokeless tobacco: Current User    Alcohol use Yes      Comment: socially       Prior to Admission medications    Not on File     Allergies   Allergen Reactions    Tramadol Rash        Review of Systems:[SA1.1]  Constitutional: negative  Eyes: negative  Ears, Nose, Mouth, Throat, and Face: negative  Respiratory: negative  Cardiovascular: negative  Gastrointestinal: negative  Genitourinary:negative  Integument/Breast: negative  Hematologic/Lymphatic: negative  Musculoskeletal:negative  Neurological: negative  Behavioral/Psychiatric: negative  Endocrine: negative  Allergic/Immunologic: negative[SA1.4]     Objective: Intake and Output:            Physical Exam:   Visit Vitals    BP 94/59 (BP 1 Location: Right arm, BP Patient Position: Sitting)    Pulse (!) 55    Temp 97 °F (36.1 °C)    Resp 20    Ht 5' 4\" (1.626 m)    Wt 59.9 kg (132 lb)    LMP 2017    SpO2 100%    Breastfeeding No    BMI 22.66 kg/m2     General:  Alert, cooperative, no distress, appears stated age. Head:  Normocephalic, without obvious abnormality, atraumatic.    Eyes:  Conjunctivae/corneas clear. PERRL, EOMs intact. Ears:  Normal external ear canals both ears. Nose: Nares normal. Septum midline. Mucosa normal. No drainage or sinus tenderness. Throat: Lips, mucosa, and tongue normal. Teeth and gums normal.   Neck: Supple, symmetrical, trachea midline, no adenopathy, thyroid: no enlargement/tenderness/nodules, no carotid bruit and no JVD. Back:   Symmetric, no curvature. ROM normal. No CVA tenderness. Lungs:   Clear to auscultation bilaterally. Chest wall:  No tenderness or deformity. Heart:  Regular rate and rhythm, S1, S2 normal, no murmur, click, rub or gallop. Abdomen:   Soft, non-tender. Bowel sounds normal. No masses,  No organomegaly. Extremities: Extremities normal, atraumatic, no cyanosis or edema. Pulses: 2+ and symmetric all extremities. Skin: Skin color, texture, turgor normal. No rashes or lesions   Lymph nodes: Cervical, supraclavicular, and axillary nodes normal.   Neurologic: CNII-XII intact. Normal strength, sensation and reflexes throughout. Data Review:   Recent Results (from the past 24 hour(s))   CBC WITH AUTOMATED DIFF    Collection Time: 12/12/17  9:44 PM   Result Value Ref Range    WBC 9.7 3.6 - 11.0 K/uL    RBC 5.17 3.80 - 5.20 M/uL    HGB 13.8 11.5 - 16.0 g/dL    HCT 43.2 35.0 - 47.0 %    MCV 83.6 80.0 - 99.0 FL    MCH 26.7 26.0 - 34.0 PG    MCHC 31.9 30.0 - 36.5 g/dL    RDW 14.4 11.5 - 14.5 %    PLATELET 514 256 - 343 K/uL    NEUTROPHILS 76 (H) 32 - 75 %    LYMPHOCYTES 20 12 - 49 %    MONOCYTES 3 (L) 5 - 13 %    EOSINOPHILS 1 0 - 7 %    BASOPHILS 0 0 - 1 %    ABS. NEUTROPHILS 7.4 1.8 - 8.0 K/UL    ABS. LYMPHOCYTES 2.0 0.8 - 3.5 K/UL    ABS. MONOCYTES 0.3 0.0 - 1.0 K/UL    ABS. EOSINOPHILS 0.1 0.0 - 0.4 K/UL    ABS.  BASOPHILS 0.0 0.0 - 0.1 K/UL   URINALYSIS W/ RFLX MICROSCOPIC    Collection Time: 12/12/17  9:44 PM   Result Value Ref Range    Color YELLOW/STRAW      Appearance CLOUDY (A) CLEAR      Specific gravity 1.030 1.003 - 1.030      pH (UA) 6.5 5.0 - 8.0      Protein NEGATIVE  NEG mg/dL    Glucose NEGATIVE  NEG mg/dL    Ketone NEGATIVE  NEG mg/dL    Bilirubin NEGATIVE  NEG      Blood NEGATIVE  NEG      Urobilinogen 1.0 0.2 - 1.0 EU/dL    Nitrites NEGATIVE  NEG      Leukocyte Esterase NEGATIVE  NEG     METABOLIC PANEL, BASIC    Collection Time: 12/12/17  9:44 PM   Result Value Ref Range    Sodium 140 136 - 145 mmol/L    Potassium 3.5 3.5 - 5.1 mmol/L    Chloride 104 97 - 108 mmol/L    CO2 29 21 - 32 mmol/L    Anion gap 7 5 - 15 mmol/L    Glucose 93 65 - 100 mg/dL    BUN 11 6 - 20 MG/DL    Creatinine 1.15 (H) 0.55 - 1.02 MG/DL    BUN/Creatinine ratio 10 (L) 12 - 20      GFR est AA >60 >60 ml/min/1.73m2    GFR est non-AA 54 (L) >60 ml/min/1.73m2    Calcium 9.2 8.5 - 10.1 MG/DL   DRUG SCREEN, URINE    Collection Time: 12/12/17  9:44 PM   Result Value Ref Range    AMPHETAMINES NEGATIVE  NEG      BARBITURATES NEGATIVE  NEG      BENZODIAZEPINES NEGATIVE  NEG      COCAINE POSITIVE (A) NEG      METHADONE POSITIVE (A) NEG      OPIATES POSITIVE (A) NEG      PCP(PHENCYCLIDINE) NEGATIVE  NEG      THC (TH-CANNABINOL) POSITIVE (A) NEG      Drug screen comment (NOTE)    ETHYL ALCOHOL    Collection Time: 12/12/17  9:44 PM   Result Value Ref Range    ALCOHOL(ETHYL),SERUM <10 <10 MG/DL   ACETAMINOPHEN    Collection Time: 12/12/17  9:44 PM   Result Value Ref Range    Acetaminophen level <2 (L) 10 - 30 ug/mL   SALICYLATE    Collection Time: 12/12/17  9:44 PM   Result Value Ref Range    Salicylate level 2.8 2.8 - 20.0 MG/DL   HCG URINE, QL. - POC    Collection Time: 12/12/17  9:47 PM   Result Value Ref Range    Pregnancy test,urine (POC) NEGATIVE  NEG     LIPID PANEL    Collection Time: 12/13/17  5:15 AM   Result Value Ref Range    LIPID PROFILE          Cholesterol, total 128 <200 MG/DL    Triglyceride 84 <150 MG/DL    HDL Cholesterol 56 MG/DL    LDL, calculated 55.2 0 - 100 MG/DL    VLDL, calculated 16.8 MG/DL    CHOL/HDL Ratio 2.3 0 - 5.0     TSH 3RD GENERATION    Collection Time: 12/13/17  5:15 AM   Result Value Ref Range    TSH 1.21 0.36 - 3.74 uIU/mL       Assessment:     Active Problems:    Bipolar disorder ([address] (12/12/2017)      Substance induced mood disorder (HCC) (12/12/2017)[SA1.1]    Opiate withdrawal    Hypotension (normaly low BP)[SA1.4]    Plan:[SA1.1]     Suggest Methadone Detox.[SA1.4]    No VTE prophylaxis indicated or necessary at this time. Signed By: Odin Valenzuela MD     December 13, 2017[SA1.1]          Revision History       User Key Date/Time User Provider Type Action    > SA1.4 12/14/17 0815 Odin Valenzuela MD Physician Sign     SA1.3 12/14/17 Greta Rizvi MD Physician      SA1.2 12/14/17 8113 Oidn Valenzuela MD Physician      SA1.1 12/13/17 Alex Vázquez MD Physician             H&P by Maurizio Puga MD at 12/13/17 7891     Author:  Maurizio Puga MD Service:  PSYCHIATRY Author Type:  Physician    Filed:  12/13/17 0913 Date of Service:  12/13/17 0903 Status:  Signed    :  Maurizio Puga MD (Physician)             INITIAL PSYCHIATRIC EVALUATION            IDENTIFICATION:    Patient Name[1.1]  Kristie Stephens[1.2]   Date of Birth[WF1.1] 1985[WF1.2]   CSN[WF1.1] [id number][WF1.2]   Medical Record Number[1.1]  [id number][IE7.1]      Age[WF1.1]  34 y. o.[WF1.2]   [address], [place][WF1.2]   Admit date:[WF1.1]  12/12/2017[WF1.2]    Room Number[1.1]  324/02[1.2]  @[WF1.1] [facility][1.2]   Date of Service[WF1.1]  12/13/2017[WF1.2]            HISTORY         REASON FOR HOSPITALIZATION:  CC: \" Pt admitted under a voluntary basis for opiate withdrawal and depression . HISTORY OF PRESENT ILLNESS:    The patient,[WF1.1] Kristie Stephens[WF1.2], is a[WF1.1] 34 y.o. BLACK OR  female[WF1.2] with a past psychiatric history significant for opiate withdrawal , who presents at this time with complaints of (and/or evidence of) the following emotional symptoms: depression.   Additional symptomatology include anxiety and anxiety attacks. The above symptoms have been present for weeks . These symptoms are of moderate severity. These symptoms are constant  in nature. The patient's condition has been precipitated by leaving Methadone program and psychosocial stressors (unemplyed  ). Patient's condition made worse by continued illicit drug use treatment noncompliance. UDS: positive  BAL=0. She is not feeling suicidal and no psychotic features andno manic symptoms       ALLERGIES:[WF1.1]   Allergies   Allergen Reactions    Tramadol Rash[WF1.2]      MEDICATIONS PRIOR TO ADMISSION:[WF1.1]   No prescriptions prior to admission.[WF1.2]      PAST MEDICAL HISTORY:[WF1.1]   Past Medical History:   Diagnosis Date    Depressive disorder, not elsewhere classified 2014     Past Surgical History:   Procedure Laterality Date    BREAST SURGERY PROCEDURE UNLISTED      cyst removal    HX GYN          HX OTHER SURGICAL      fibroid removal left breast[WF1.2]      SOCIAL HISTORY: single[WF1.1]    Social History     Social History    Marital status: SINGLE     Spouse name: N/A    Number of children: N/A    Years of education: N/A     Occupational History    Not on file. Social History Main Topics    Smoking status: Current Every Day Smoker     Packs/day: 0.50     Years: 10.00    Smokeless tobacco: Current User    Alcohol use Yes      Comment: socially    Drug use: Yes     Special: Heroin, Marijuana, Cocaine      Comment: lasted used 1 year ago    Sexual activity: Yes     Partners: Female     Birth control/ protection: Condom     Other Topics Concern    Not on file     Social History Narrative[WF1.2]      FAMILY HISTORY: History reviewed.  No pertinent family history.[WF1.1]   Family History   Problem Relation Age of Onset    Malignant Hyperthermia Neg Hx     Pseudocholinesterase Deficiency Neg Hx     Delayed Awakening Neg Hx     Post-op Nausea/Vomiting Neg Hx     Emergence Delirium Neg Hx     Post-op Cognitive Dysfunction Neg Hx     Other Neg Hx[WF1.2]        REVIEW OF SYSTEMS:   History obtained from the patient  Pertinent items are noted in the History of Present Illness. All other Systems reviewed and are considered negative. MENTAL STATUS EXAM & VITALS     MENTAL STATUS EXAM (MSE):    MSE FINDINGS ARE WITHIN NORMAL LIMITS (WNL) UNLESS OTHERWISE STATED BELOW. ( ALL OF THE BELOW CATEGORIES OF THE MSE HAVE BEEN REVIEWED (reviewed[WF1.1] 12/13/2017[WF1.2]) AND UPDATED AS DEEMED APPROPRIATE )  General Presentation age appropriate, cooperative   Orientation oriented to time, place and person   Vital Signs  See below (reviewed[WF1.1] 12/13/2017[WF1.2]); Vital Signs (BP, Pulse, & Temp) are within normal limits if not listed below.    Gait and Station Stable/steady, no ataxia   Musculoskeletal System No extrapyramidal symptoms (EPS); no abnormal muscular movements or Tardive Dyskinesia (TD); muscle strength and tone are within normal limits   Language No aphasia or dysarthria   Speech:  normal pitch and normal volume   Thought Processes logical; normal rate of thoughts; good abstract reasoning/computation   Thought Associations goal directed   Thought Content free of delusions   Suicidal Ideations no plan  and no intention   Homicidal Ideations no plan  and no intention   Mood:  anxious    Affect:  anxious   Memory recent  good   Memory remote:  good   Concentration/Attention:  good   Fund of Knowledge average   Insight:  good   Reliability fair   Judgment:  fair          VITALS:[WF1.1]     Patient Vitals for the past 24 hrs:   Temp Pulse Resp BP SpO2   12/13/17 0737 - 60 - (!) 83/60 -   12/13/17 0630 - - 20 92/52 -   12/13/17 0530 97 °F (36.1 °C) (!) 56 20 (!) 82/46 -   12/13/17 0058 97.3 °F (36.3 °C) 65 18 99/64 -   12/12/17 2332 - 67 16 111/76 100 %   12/12/17 2317 98.3 °F (36.8 °C) 76 16 (!) 131/96 100 %   12/12/17 2158 98.2 °F (36.8 °C) 67 20 132/69 100 %   12/12/17 2119 98.7 °F (37.1 °C) (!) 105 18 149/71 100 %     Wt Readings from Last 3 Encounters:   12/12/17 59.9 kg (132 lb)   06/22/17 68.9 kg (152 lb)   09/15/16 63.5 kg (140 lb)     Temp Readings from Last 3 Encounters:   12/13/17 97 °F (36.1 °C)   06/22/17 98.5 °F (36.9 °C)   09/15/16 98.5 °F (36.9 °C)     BP Readings from Last 3 Encounters:   12/13/17 (!) 83/60   06/22/17 132/88   09/15/16 128/81     Pulse Readings from Last 3 Encounters:   12/13/17 60   06/22/17 76   09/15/16 95[WF1.2]            DATA     LABORATORY DATA:[WF1.1]  Labs Reviewed   CBC WITH AUTOMATED DIFF - Abnormal; Notable for the following:        Result Value    NEUTROPHILS 76 (*)     MONOCYTES 3 (*)     All other components within normal limits   URINALYSIS W/ RFLX MICROSCOPIC - Abnormal; Notable for the following:     Appearance CLOUDY (*)     All other components within normal limits   METABOLIC PANEL, BASIC - Abnormal; Notable for the following:     Creatinine 1.15 (*)     BUN/Creatinine ratio 10 (*)     GFR est non-AA 54 (*)     All other components within normal limits   DRUG SCREEN, URINE - Abnormal; Notable for the following:     COCAINE POSITIVE (*)     METHADONE POSITIVE (*)     OPIATES POSITIVE (*)     THC (TH-CANNABINOL) POSITIVE (*)     All other components within normal limits   ACETAMINOPHEN - Abnormal; Notable for the following:     Acetaminophen level <2 (*)     All other components within normal limits   ETHYL ALCOHOL   SALICYLATE   LIPID PANEL   TSH 3RD GENERATION   HCG URINE, QL. - POC     Admission on 12/12/2017   Component Date Value Ref Range Status    WBC 12/12/2017 9.7  3.6 - 11.0 K/uL Final    RBC 12/12/2017 5.17  3.80 - 5.20 M/uL Final    HGB 12/12/2017 13.8  11.5 - 16.0 g/dL Final    HCT 12/12/2017 43.2  35.0 - 47.0 % Final    MCV 12/12/2017 83.6  80.0 - 99.0 FL Final    MCH 12/12/2017 26.7  26.0 - 34.0 PG Final    MCHC 12/12/2017 31.9  30.0 - 36.5 g/dL Final    RDW 12/12/2017 14.4  11.5 - 14.5 % Final    PLATELET 48/28/1508 850  150 - 400 K/uL Final    NEUTROPHILS 12/12/2017 76* 32 - 75 % Final    LYMPHOCYTES 12/12/2017 20  12 - 49 % Final    MONOCYTES 12/12/2017 3* 5 - 13 % Final    EOSINOPHILS 12/12/2017 1  0 - 7 % Final    BASOPHILS 12/12/2017 0  0 - 1 % Final    ABS. NEUTROPHILS 12/12/2017 7.4  1.8 - 8.0 K/UL Final    ABS. LYMPHOCYTES 12/12/2017 2.0  0.8 - 3.5 K/UL Final    ABS. MONOCYTES 12/12/2017 0.3  0.0 - 1.0 K/UL Final    ABS. EOSINOPHILS 12/12/2017 0.1  0.0 - 0.4 K/UL Final    ABS.  BASOPHILS 12/12/2017 0.0  0.0 - 0.1 K/UL Final    Color 12/12/2017 YELLOW/STRAW    Final    Appearance 12/12/2017 CLOUDY* CLEAR   Final    Specific gravity 12/12/2017 1.030  1.003 - 1.030   Final    pH (UA) 12/12/2017 6.5  5.0 - 8.0   Final    Protein 12/12/2017 NEGATIVE   NEG mg/dL Final    Glucose 12/12/2017 NEGATIVE   NEG mg/dL Final    Ketone 12/12/2017 NEGATIVE   NEG mg/dL Final    Bilirubin 12/12/2017 NEGATIVE   NEG   Final    Blood 12/12/2017 NEGATIVE   NEG   Final    Urobilinogen 12/12/2017 1.0  0.2 - 1.0 EU/dL Final    Nitrites 12/12/2017 NEGATIVE   NEG   Final    Leukocyte Esterase 12/12/2017 NEGATIVE   NEG   Final    Sodium 12/12/2017 140  136 - 145 mmol/L Final    Potassium 12/12/2017 3.5  3.5 - 5.1 mmol/L Final    Chloride 12/12/2017 104  97 - 108 mmol/L Final    CO2 12/12/2017 29  21 - 32 mmol/L Final    Anion gap 12/12/2017 7  5 - 15 mmol/L Final    Glucose 12/12/2017 93  65 - 100 mg/dL Final    BUN 12/12/2017 11  6 - 20 MG/DL Final    Creatinine 12/12/2017 1.15* 0.55 - 1.02 MG/DL Final    BUN/Creatinine ratio 12/12/2017 10* 12 - 20   Final    GFR est AA 12/12/2017 >60  >60 ml/min/1.73m2 Final    GFR est non-AA 12/12/2017 54* >60 ml/min/1.73m2 Final    Calcium 12/12/2017 9.2  8.5 - 10.1 MG/DL Final    AMPHETAMINES 12/12/2017 NEGATIVE   NEG   Final    BARBITURATES 12/12/2017 NEGATIVE   NEG   Final    BENZODIAZEPINES 12/12/2017 NEGATIVE   NEG   Final    COCAINE 12/12/2017 POSITIVE* NEG   Final    METHADONE 12/12/2017 POSITIVE* NEG   Final    OPIATES 12/12/2017 POSITIVE* NEG   Final    PCP(PHENCYCLIDINE) 12/12/2017 NEGATIVE   NEG   Final    THC (TH-CANNABINOL) 12/12/2017 POSITIVE* NEG   Final    Drug screen comment 12/12/2017 (NOTE)   Final    ALCOHOL(ETHYL),SERUM 12/12/2017 <10  <10 MG/DL Final    Acetaminophen level 12/12/2017 <2* 10 - 30 ug/mL Final    Salicylate level 05/08/4666 2.8  2.8 - 20.0 MG/DL Final    Pregnancy test,urine (POC) 12/12/2017 NEGATIVE   NEG   Final    LIPID PROFILE 12/13/2017        Final    Cholesterol, total 12/13/2017 128  <200 MG/DL Final    Triglyceride 12/13/2017 84  <150 MG/DL Final    HDL Cholesterol 12/13/2017 56  MG/DL Final    LDL, calculated 12/13/2017 55.2  0 - 100 MG/DL Final    VLDL, calculated 12/13/2017 16.8  MG/DL Final    CHOL/HDL Ratio 12/13/2017 2.3  0 - 5.0   Final    TSH 12/13/2017 1.21  0.36 - 3.74 uIU/mL Final[WF1.2]        RADIOLOGY REPORTS:[WF1.1]    Results from [place] encounter on 06/26/13   XR 2ND TOE LT MIN 2 V   Narrative **Final Report**      ICD Codes / Adm. Diagnosis: 831117  626.0 / Toe Pain    Examination:  CR TOE 2ND MIN 2 VWS LT  - [id number] - Jun 26 2013  6:59AM  Accession No:  [id number]  Reason:  trauma 10 days ago      REPORT:  EXAM: CR TOE 2ND MIN 2 VWS LT    INDICATION:   trauma 10 days ago    COMPARISON: None. FINDINGS:  Three views of the left second toe demonstrates no fracture   dislocation or other osseous abnormality. IMPRESSION:      Normal left second toe . Signing/Reading Doctor: Magda Keller ([id number])    Approved: Magda Keller ([id number])  Jun 26 2013  7:08AM[WF1.2]                             No results found.            MEDICATIONS       ALL MEDICATIONS[WF1.1]  Current Facility-Administered Medications   Medication Dose Route Frequency    influenza vaccine 2017-18 (3 yrs+)(PF) (FLUZONE QUAD/FLUARIX QUAD) injection 0.5 mL  0.5 mL IntraMUSCular PRIOR TO DISCHARGE    ziprasidone (GEODON) 20 mg in sterile water (preservative free) 1 mL injection  20 mg IntraMUSCular BID PRN    OLANZapine (ZyPREXA) tablet 5 mg  5 mg Oral Q6H PRN    benztropine (COGENTIN) tablet 2 mg  2 mg Oral BID PRN    benztropine (COGENTIN) injection 2 mg  2 mg IntraMUSCular BID PRN    LORazepam (ATIVAN) injection 2 mg  2 mg IntraMUSCular Q4H PRN    LORazepam (ATIVAN) tablet 1 mg  1 mg Oral Q4H PRN    zolpidem (AMBIEN) tablet 10 mg  10 mg Oral QHS PRN    acetaminophen (TYLENOL) tablet 650 mg  650 mg Oral Q4H PRN    ibuprofen (MOTRIN) tablet 400 mg  400 mg Oral Q8H PRN    magnesium hydroxide (MILK OF MAGNESIA) 400 mg/5 mL oral suspension 30 mL  30 mL Oral DAILY PRN    nicotine (NICODERM CQ) 21 mg/24 hr patch 1 Patch  1 Patch TransDERmal DAILY PRN[WF1.2]      SCHEDULED MEDICATIONS[WF1.1]  Current Facility-Administered Medications   Medication Dose Route Frequency    influenza vaccine 2017-18 (3 yrs+)(PF) (FLUZONE QUAD/FLUARIX QUAD) injection 0.5 mL  0.5 mL IntraMUSCular PRIOR TO DISCHARGE[WF1.2]                ASSESSMENT & PLAN        The patient,[WF1.1] Mary Jane Stephens[WF1.2], is a[WF1.1] 34 y.o.  female[WF1.2] who presents at this time for treatment of the following diagnoses:  Patient Active Hospital Problem List:   Bipolar disorder ([facility] (12/12/2017)    Assessment: depression     Plan: need collateral information    Substance induced mood disorder ([facility] (12/12/2017)    Assessment: Opiate abuse     Plan: opiate detox           I will continue to monitor blood levels (Depakote, Tegretol, lithium, clozapine---a drug with a narrow therapeutic index= NTI) and associated labs for drug therapy implemented that require intense monitoring for toxicity as deemed appropriate based on current medication side effects and pharmacodynamically determined drug 1/2 lives.          A coordinated, multidisplinary treatment team (includes the nurse, unit pharmcist,  and writer) round was conducted for this initial evaluation with the patient present. The following regarding medications was addressed during rounds with patient:   the risks and benefits of the proposed medication. The patient was given the opportunity to ask questions. Informed consent given to the use of the above medications. I will continue to adjust psychiatric and non-psychiatric medications (see above \"medication\" section and orders section for details) as deemed appropriate & based upon diagnoses and response to treatment. I have reviewed admission (and previous/old) labs and medical tests in the EHR and or transferring hospital documents. I will continue to order blood tests/labs and diagnostic tests as deemed appropriate and review results as they become available (see orders for details). I have reviewed old psychiatric and medical records available in the EHR. I Will order additional psychiatric records from other institutions to further elucidate the nature of patient's psychopathology and review once available. I will gather additional collateral information from friends, family and o/p treatment team to further elucidate the nature of patient's psychopathology and baselline level of psychiatric functioning.       ESTIMATED LENGTH OF STAY:    3       STRENGTHS:  Exercising self-direction/Resourceful, Access to housing/residential stability and Interpersonal/supportive relationships (family, friends, peers)                                        SIGNED:[WF1.1]    Bao Orlando MD  12/13/2017[WF1.2]       Revision History       User Key Date/Time User Provider Type Action    > WF1.2 12/13/17 0913 Bao Orlando MD Physician Sign     WF1.1 12/13/17 1044 Bao Orlando MD Physician               Admission Diagnosis: Substance induced mood disorder ([address]  Bipolar disorder ([address]  Substance induced mood disorder ([address]    * No surgery found *    Results for orders placed or performed during the hospital encounter of 12/12/17   CBC WITH AUTOMATED DIFF Result Value Ref Range    WBC 9.7 3.6 - 11.0 K/uL    RBC 5.17 3.80 - 5.20 M/uL    HGB 13.8 11.5 - 16.0 g/dL    HCT 43.2 35.0 - 47.0 %    MCV 83.6 80.0 - 99.0 FL    MCH 26.7 26.0 - 34.0 PG    MCHC 31.9 30.0 - 36.5 g/dL    RDW 14.4 11.5 - 14.5 %    PLATELET 970 179 - 300 K/uL    NEUTROPHILS 76 (H) 32 - 75 %    LYMPHOCYTES 20 12 - 49 %    MONOCYTES 3 (L) 5 - 13 %    EOSINOPHILS 1 0 - 7 %    BASOPHILS 0 0 - 1 %    ABS. NEUTROPHILS 7.4 1.8 - 8.0 K/UL    ABS. LYMPHOCYTES 2.0 0.8 - 3.5 K/UL    ABS. MONOCYTES 0.3 0.0 - 1.0 K/UL    ABS. EOSINOPHILS 0.1 0.0 - 0.4 K/UL    ABS.  BASOPHILS 0.0 0.0 - 0.1 K/UL   URINALYSIS W/ RFLX MICROSCOPIC   Result Value Ref Range    Color YELLOW/STRAW      Appearance CLOUDY (A) CLEAR      Specific gravity 1.030 1.003 - 1.030      pH (UA) 6.5 5.0 - 8.0      Protein NEGATIVE  NEG mg/dL    Glucose NEGATIVE  NEG mg/dL    Ketone NEGATIVE  NEG mg/dL    Bilirubin NEGATIVE  NEG      Blood NEGATIVE  NEG      Urobilinogen 1.0 0.2 - 1.0 EU/dL    Nitrites NEGATIVE  NEG      Leukocyte Esterase NEGATIVE  NEG     METABOLIC PANEL, BASIC   Result Value Ref Range    Sodium 140 136 - 145 mmol/L    Potassium 3.5 3.5 - 5.1 mmol/L    Chloride 104 97 - 108 mmol/L    CO2 29 21 - 32 mmol/L    Anion gap 7 5 - 15 mmol/L    Glucose 93 65 - 100 mg/dL    BUN 11 6 - 20 MG/DL    Creatinine 1.15 (H) 0.55 - 1.02 MG/DL    BUN/Creatinine ratio 10 (L) 12 - 20      GFR est AA >60 >60 ml/min/1.73m2    GFR est non-AA 54 (L) >60 ml/min/1.73m2    Calcium 9.2 8.5 - 10.1 MG/DL   DRUG SCREEN, URINE   Result Value Ref Range    AMPHETAMINES NEGATIVE  NEG      BARBITURATES NEGATIVE  NEG      BENZODIAZEPINES NEGATIVE  NEG      COCAINE POSITIVE (A) NEG      METHADONE POSITIVE (A) NEG      OPIATES POSITIVE (A) NEG      PCP(PHENCYCLIDINE) NEGATIVE  NEG      THC (TH-CANNABINOL) POSITIVE (A) NEG      Drug screen comment (NOTE)    ETHYL ALCOHOL   Result Value Ref Range    ALCOHOL(ETHYL),SERUM <10 <10 MG/DL   ACETAMINOPHEN   Result Value Ref Range Acetaminophen level <2 (L) 10 - 30 ug/mL   SALICYLATE   Result Value Ref Range    Salicylate level 2.8 2.8 - 20.0 MG/DL   LIPID PANEL   Result Value Ref Range    LIPID PROFILE          Cholesterol, total 128 <200 MG/DL    Triglyceride 84 <150 MG/DL    HDL Cholesterol 56 MG/DL    LDL, calculated 55.2 0 - 100 MG/DL    VLDL, calculated 16.8 MG/DL    CHOL/HDL Ratio 2.3 0 - 5.0     TSH 3RD GENERATION   Result Value Ref Range    TSH 1.21 0.36 - 3.74 uIU/mL   HCG URINE, QL. - POC   Result Value Ref Range    Pregnancy test,urine (POC) NEGATIVE  NEG     EKG, 12 LEAD, INITIAL   Result Value Ref Range    Ventricular Rate 56 BPM    Atrial Rate 56 BPM    P-R Interval 150 ms    QRS Duration 82 ms    Q-T Interval 414 ms    QTC Calculation (Bezet) 399 ms    Calculated P Axis 42 degrees    Calculated R Axis 70 degrees    Calculated T Axis 76 degrees    Diagnosis       Sinus bradycardia  Otherwise normal ECG  When compared with ECG of 02-SEP-2009 17:48,  Vent. rate has decreased BY  30 BPM         Immunizations administered during this encounter:   Immunization History   Administered Date(s) Administered    Influenza Vaccine (Quad) PF 12/13/2017    Influenza Vaccine PF 01/03/2014       Screening for Metabolic Disorders for Patients on Antipsychotic Medications  (Data obtained from the EMR)    Estimated Body Mass Index  Estimated body mass index is 22.66 kg/(m^2) as calculated from the following:    Height as of this encounter: 5' 4\" (1.626 m). Weight as of this encounter: 59.9 kg (132 lb).      Vital Signs/Blood Pressure  Visit Vitals    BP 95/52    Pulse (!) 56    Temp 97.4 °F (36.3 °C)    Resp 16    Ht 5' 4\" (1.626 m)    Wt 59.9 kg (132 lb)    LMP 12/03/2017    SpO2 100%    Breastfeeding No    BMI 22.66 kg/m2       Blood Glucose/Hemoglobin A1c  Lab Results   Component Value Date/Time    Glucose 93 12/12/2017 09:44 PM    Glucose (POC) 97 06/23/2015 09:32 AM    Glucose (POC) 86 05/14/2009 12:55 PM        No results found for: HBA1C, HGBE8, KIQ8HHLR     Lipid Panel  Lab Results   Component Value Date/Time    Cholesterol, total 128 12/13/2017 05:15 AM    HDL Cholesterol 56 12/13/2017 05:15 AM    LDL, calculated 55.2 12/13/2017 05:15 AM    Triglyceride 84 12/13/2017 05:15 AM    CHOL/HDL Ratio 2.3 12/13/2017 05:15 AM       Discharge Diagnosis: Please refer to physician's discharge summary. Discharge Plan: Pt will to discharging today. Per pt's mothers request pt wrote a letter of rules she will follow to stay in her mother's home. Pt signed this letter and will be transported by volunteer hospital transportation home this afternoon.  provided pt with information for Shelter intake and overflow incase plans change after discharge. Pt is in agreement with plan. Discharge Medication List and Instructions: There are no discharge medications for this patient. Unresulted Labs     None        To obtain results of studies pending at discharge, please contact N/A. Follow-up Information     Follow up With Details Comments [facility], [place]   [address] [place] [address] in 1 week Intake appointment for services. Due to the limited slots, you may want to arrive by 7:00am. The doors open at 7:30am and you should sign in at the registration desk. [phone]  Hours: Monday-Friday 8:30am- 12:00PM & 1:00PM-4:30pm   [phone] (phone)  [phone] (fax)      Thang Vidal on 12/18/2017 Rapid access appointment 8:00AM -11:00AM & 12:00PM-3:00PM for mental health and substance abuse services. [address] [place] [address]  [address] [facility]  [phone]  Fax: [phone]    [facility] Shelter  Go today REGISTRATION TIME IS FROM 6:62JE-5:90AP. Public Safety [facility], [address] [facility].       Hours of operation are 7:00 pm to 10:00 am.            Advanced Directive:   Does the patient have an appointed surrogate decision maker? Unknown   Does the patient have a Medical Advance Directive? Unknown   Does the patient have a Psychiatric Advance Directive? Unknown   If the patient does not have a surrogate or Medical Advance Directive AND Psychiatric Advance Directive, the patient was offered information on these advance directives Unknown       Patient Instructions: Please continue all medications until otherwise directed by physician. Tobacco Cessation Discharge Plan:   Is the patient a smoker and needs referral for smoking cessation? No  Patient referred to the following for smoking cessation with an appointment? No  Patient was offered medication to assist with smoking cessation at discharge? No  Was education for smoking cessation added to the discharge instructions? No    Alcohol/Substance Abuse Discharge Plan:   Does the patient have a history of substance/alcohol abuse and requires a referral for treatment? Yes  Patient referred to the following for substance/alcohol abuse treatment with an appointment? Yes  Patient was offered medication to assist with alcohol cessation at discharge? Yes  Was education for substance/alcohol abuse added to discharge instructions? Yes    Patient discharged to her mother's home; provided to the patient/caregiver either in hard copy or electronically. Pt is alert and oriented. Pt denies SI/HI. Pt is free of delusions. Pt's mood was euthymic. Pt's thought process was logical. Pt's insight and judgment are good. Pt is in agreement with the plan.

## 2017-12-15 NOTE — DISCHARGE INSTRUCTIONS
DISCHARGE SUMMARY from Nurse    The following personal items are in your possession at time of discharge:       Visual Aid: None        Jewelry: Earrings (ordaz earrings in purse)  Clothing: Belt, Jacket/Coat, Slippers, Other (comment) (scarf,head scarf)  Other Valuables: Cell Phone, Shwetha Hand, Cigarettes, Lighter/matches (2 cell phones cigarettes lighter 15cents)         PATIENT INSTRUCTIONS:    If I feel that I can not keep these promises and I am at risk of hurting myself or others, I will call the crisis office and speak with a crisis worker who will assist me during my crisis. [facility] Crisis  Melissaashkan Lockhart   [address] Crisis  [phone]           These are general instructions for a healthy lifestyle:    No smoking/ No tobacco products/ Avoid exposure to second hand smoke    Surgeon General's Warning:  Quitting smoking now greatly reduces serious risk to your health. Obesity, smoking, and sedentary lifestyle greatly increases your risk for illness    A healthy diet, regular physical exercise & weight monitoring are important for maintaining a healthy lifestyle    You may be retaining fluid if you have a history of heart failure or if you experience any of the following symptoms:  Weight gain of 3 pounds or more overnight or 5 pounds in a week, increased swelling in our hands or feet or shortness of breath while lying flat in bed. Please call your doctor as soon as you notice any of these symptoms; do not wait until your next office visit. Recognize signs and symptoms of STROKE:    F-face looks uneven    A-arms unable to move or move unevenly    S-speech slurred or non-existent    T-time-call 911 as soon as signs and symptoms begin-DO NOT go       Back to bed or wait to see if you get better-TIME IS BRAIN.     Warning Signs of HEART ATTACK     Call 911 if you have these symptoms:   Chest discomfort. Most heart attacks involve discomfort in the center of the chest that lasts more than a few minutes, or that goes away and comes back. It can feel like uncomfortable pressure, squeezing, fullness, or pain.  Discomfort in other areas of the upper body. Symptoms can include pain or discomfort in one or both arms, the back, neck, jaw, or stomach.  Shortness of breath with or without chest discomfort.  Other signs may include breaking out in a cold sweat, nausea, or lightheadedness. Don't wait more than five minutes to call 911 - MINUTES MATTER! Fast action can save your life. Calling 911 is almost always the fastest way to get lifesaving treatment. Emergency Medical Services staff can begin treatment when they arrive -- up to an hour sooner than if someone gets to the hospital by car. Myself and/or my family have received education about my diagnosis throughout my hospital stay. During my hospital stay, I and/or my family/caregiver were included in planning my care upon discharge. My needs were taken into consideration and I was included in my discharge planning. I had an opportunity to ask questions. The discharge information has been reviewed with the patient. The patient verbalized understanding. Discharge medications reviewed with the patient and appropriate educational materials and side effects teaching were provided.

## 2017-12-15 NOTE — PROGRESS NOTES
Problem: Suicide/Homicide (Adult/Pediatric)  Goal: *STG: Remains safe in hospital  Outcome: Progressing Towards Goal  Pt slept 3 hours. Pt had uneventful night and required no PRN's. Pt had no complaints or signs of distress throughout night. Respirations were unlabored. Continuing to monitor with q15 min rounds for safety.

## 2017-12-17 LAB
ATRIAL RATE: 56 BPM
CALCULATED P AXIS, ECG09: 42 DEGREES
CALCULATED R AXIS, ECG10: 70 DEGREES
CALCULATED T AXIS, ECG11: 76 DEGREES
DIAGNOSIS, 93000: NORMAL
P-R INTERVAL, ECG05: 150 MS
Q-T INTERVAL, ECG07: 414 MS
QRS DURATION, ECG06: 82 MS
QTC CALCULATION (BEZET), ECG08: 399 MS
VENTRICULAR RATE, ECG03: 56 BPM

## 2018-01-17 ENCOUNTER — HOSPITAL ENCOUNTER (EMERGENCY)
Age: 33
Discharge: HOME | End: 2018-01-17
Attending: EMERGENCY MEDICINE
Payer: SUBSIDIZED

## 2018-01-17 VITALS
WEIGHT: 137 LBS | TEMPERATURE: 98.2 F | DIASTOLIC BLOOD PRESSURE: 89 MMHG | OXYGEN SATURATION: 100 % | RESPIRATION RATE: 18 BRPM | HEIGHT: 64 IN | SYSTOLIC BLOOD PRESSURE: 134 MMHG | BODY MASS INDEX: 23.39 KG/M2

## 2018-01-17 DIAGNOSIS — N72 CERVICITIS: ICD-10-CM

## 2018-01-17 DIAGNOSIS — Z20.2 STD EXPOSURE: Primary | ICD-10-CM

## 2018-01-17 PROCEDURE — 99281 EMR DPT VST MAYX REQ PHY/QHP: CPT

## 2018-01-17 NOTE — ED PROVIDER NOTES
EMERGENCY DEPARTMENT HISTORY AND PHYSICAL EXAM      Date: 2018  Patient Name: Veena Mejia    History of Presenting Illness     Chief Complaint   Patient presents with    Vaginal Discharge     Pt states her boyfriend was tx for gonorrhea, chlamydia, and trich a few days ago. Pt would like to be treated. Pt is also c/o vag discharge and LBP. History Provided By: Patient    HPI: Veena Mejia, 34 y.o. female with PMHx significant for depressive disorder who presents ambulatory to the ED with cc of exposure to an STD. Pt reports associated lower back pain and malodorous vaginal discharge. She denies use of medication to modify her symptoms. Pt reports that her partner was recently treated for GC, chlamydia, and trichomonas. She denies any dysuria, vaginal itching, N/V/D, fevers, or chills. + tobacco use (0.5 ppd), + EtOH use, + Illicit drug use    PCP: Romana Glass, MD    Given the pt is her for exposure to STD, there is no applicable location, quality, duration, severity, timing, context, associated sxs, additional context, or modifying factors. There are no other complaints, changes, or physical findings at this time.     Past History     Past Medical History:  Past Medical History:   Diagnosis Date    Depressive disorder, not elsewhere classified 2014       Past Surgical History:  Past Surgical History:   Procedure Laterality Date    BREAST SURGERY PROCEDURE UNLISTED      cyst removal    HX GYN          HX OTHER SURGICAL      fibroid removal left breast       Family History:  Family History   Problem Relation Age of Onset    Malignant Hyperthermia Neg Hx     Pseudocholinesterase Deficiency Neg Hx     Delayed Awakening Neg Hx     Post-op Nausea/Vomiting Neg Hx     Emergence Delirium Neg Hx     Post-op Cognitive Dysfunction Neg Hx     Other Neg Hx        Social History:  Social History   Substance Use Topics    Smoking status: Current Every Day Smoker     Packs/day: 0.50 Years: 10.00    Smokeless tobacco: Current User    Alcohol use Yes      Comment: socially       Allergies: Allergies   Allergen Reactions    Tramadol Rash         Review of Systems   Review of Systems   Constitutional: Negative. Negative for chills, fever and unexpected weight change. HENT: Negative. Negative for congestion and trouble swallowing. Eyes: Negative for discharge. Respiratory: Negative. Negative for cough, chest tightness and shortness of breath. Cardiovascular: Negative. Negative for chest pain. Gastrointestinal: Negative. Negative for abdominal distention, abdominal pain, constipation, diarrhea, nausea and vomiting. Endocrine: Negative. Genitourinary: Positive for vaginal discharge. Negative for difficulty urinating, dysuria, frequency and urgency. + vaginal itching   Musculoskeletal: Positive for back pain (lower). Negative for arthralgias and myalgias. Skin: Negative. Negative for color change. Allergic/Immunologic: Negative. Neurological: Negative. Negative for dizziness, speech difficulty and headaches. Hematological: Negative. Psychiatric/Behavioral: Negative. Negative for agitation and confusion. All other systems reviewed and are negative. Physical Exam   Physical Exam   Constitutional: She is oriented to person, place, and time. She appears well-developed and well-nourished. HENT:   Head: Normocephalic and atraumatic. Eyes: Conjunctivae and EOM are normal.   Neck: Neck supple. Cardiovascular: Normal rate, regular rhythm and intact distal pulses. Pulmonary/Chest: Effort normal. No respiratory distress. Abdominal: Soft. There is no rebound and no guarding. Low abdominal tenderness   Genitourinary:   Genitourinary Comments: Pt offered pelvic. Pt declines pelvic exam   Musculoskeletal: Normal range of motion. She exhibits no deformity. Neurological: She is alert and oriented to person, place, and time.    Skin: Skin is warm and dry. Psychiatric: She has a normal mood and affect. Her behavior is normal. Thought content normal.   Vitals reviewed. Medical Decision Making   I am the first provider for this patient. I reviewed the vital signs, available nursing notes, past medical history, past surgical history, family history and social history. Vital Signs-Reviewed the patient's vital signs. Patient Vitals for the past 12 hrs:   Temp Resp BP SpO2   01/17/18 0308 98.2 °F (36.8 °C) 18 134/89 100 %     Records Reviewed: Nursing Notes and Old Medical Records    Provider Notes (Medical Decision Making):     GC, chlamydia, trichomonas, cervicitis    ED Course:   Initial assessment performed. The patients presenting problems have been discussed, and they are in agreement with the care plan formulated and outlined with them. I have encouraged them to ask questions as they arise throughout their visit. The documentation for this period is being entered following the guidelines as defined in the [facility] policy by Gretta Estrada. Disposition:    DISCHARGE NOTE  5:25 AM  The patient has been re-evaluated and is ready for discharge. Reviewed available results with patient. Counseled patient on diagnosis and care plan. Patient has expressed understanding, and all questions have been answered. Patient agrees with plan and agrees to follow up as recommended, or return to the ED if their symptoms worsen. Discharge instructions have been provided and explained to the patient, along with reasons to return to the ED. PLAN:  1. There are no discharge medications for this patient. 2.   Follow-up Information     None        Return to ED if worse     Diagnosis     Clinical Impression:   1. STD exposure    2. Cervicitis        Attestations:     This note is prepared by Paola Davila, acting as Scribe for Cinthya Macdonald MD.    Cinthya Macdonald MD: The scribe's documentation has been prepared under my direction and personally reviewed by me in its entirety. I confirm that the note above accurately reflects all work, treatment, procedures, and medical decision making performed by me.

## 2018-01-17 NOTE — ED NOTES
The documentation for this period is being entered following the guidelines as defined in the [facility] downtime policy by Lin Gonzalez RN. Please see paper chart for additional information.

## 2019-06-28 NOTE — PROGRESS NOTES
GROUP THERAPY PROGRESS NOTE      Dulcekwabena Mahan was present for medication group. GROUP TIME: 45 minutes, Thursdays 2pm    PERSONAL GOAL FOR PARTICIPATION: To be present for group, participate in discussion, and answer patient-directed questions. GOAL ORIENTATION: Personal    THERAPEUTIC INTERVENTIONS REVIEWED AND DISCUSSED: The following topics were presented: storage of medications, how to remember to refill medications and keep up with doctor appointments, relapse prevention, keeping a record of all medication including prescription and non-prescription drugs, and who to contact with medication questions. Patients were given time to ask questions regarding their current therapy. IMPRESSION OF PARTICIPATION: Patient participated minimally in group discussions. Patient participated in medication BINGO.        Arnol Vizcaino, PHARMD, BCPS none

## 2021-07-16 ENCOUNTER — HOSPITAL ENCOUNTER (EMERGENCY)
Age: 36
Discharge: LEFT BEFORE DISCHARGE | End: 2021-07-16
Attending: EMERGENCY MEDICINE
Payer: MEDICAID

## 2021-07-16 ENCOUNTER — APPOINTMENT (OUTPATIENT)
Dept: CT IMAGING | Age: 36
End: 2021-07-16
Attending: EMERGENCY MEDICINE
Payer: MEDICAID

## 2021-07-16 ENCOUNTER — APPOINTMENT (OUTPATIENT)
Dept: GENERAL RADIOLOGY | Age: 36
End: 2021-07-16
Attending: EMERGENCY MEDICINE
Payer: MEDICAID

## 2021-07-16 VITALS
SYSTOLIC BLOOD PRESSURE: 106 MMHG | WEIGHT: 152 LBS | RESPIRATION RATE: 12 BRPM | HEIGHT: 65 IN | TEMPERATURE: 98.4 F | OXYGEN SATURATION: 97 % | BODY MASS INDEX: 25.33 KG/M2 | HEART RATE: 69 BPM | DIASTOLIC BLOOD PRESSURE: 77 MMHG
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
## 2021-07-16 DIAGNOSIS — S01.81XA FACIAL LACERATION, INITIAL ENCOUNTER: ICD-10-CM

## 2021-07-16 LAB
ANION GAP SERPL CALC-SCNC: 5 MMOL/L (ref 5–15)
BUN SERPL-MCNC: 10 MG/DL (ref 6–20)
BUN/CREAT SERPL: 11 (ref 12–20)
CA-I BLD-MCNC: 9.3 MG/DL (ref 8.5–10.1)
CHLORIDE SERPL-SCNC: 108 MMOL/L (ref 97–108)
CO2 SERPL-SCNC: 27 MMOL/L (ref 21–32)
CREAT SERPL-MCNC: 0.89 MG/DL (ref 0.55–1.02)
ERYTHROCYTE [DISTWIDTH] IN BLOOD BY AUTOMATED COUNT: 14.6 % (ref 11.5–14.5)
ETHANOL SERPL-MCNC: 8 MG/DL
GLUCOSE SERPL-MCNC: 80 MG/DL (ref 65–100)
HCG SERPL QL: NEGATIVE
HCT VFR BLD AUTO: 42.4 % (ref 35–47)
HGB BLD-MCNC: 13.6 G/DL (ref 11.5–16)
MCH RBC QN AUTO: 27.9 PG (ref 26–34)
MCHC RBC AUTO-ENTMCNC: 32.1 G/DL (ref 30–36.5)
MCV RBC AUTO: 86.9 FL (ref 80–99)
NRBC # BLD: 0 K/UL (ref 0–0.01)
NRBC BLD-RTO: 0 PER 100 WBC
PLATELET # BLD AUTO: 249 K/UL (ref 150–400)
PMV BLD AUTO: 11.2 FL (ref 8.9–12.9)
POTASSIUM SERPL-SCNC: 4.1 MMOL/L (ref 3.5–5.1)
RBC # BLD AUTO: 4.88 M/UL (ref 3.8–5.2)
SODIUM SERPL-SCNC: 140 MMOL/L (ref 136–145)
WBC # BLD AUTO: 6.6 K/UL (ref 3.6–11)

## 2021-07-16 PROCEDURE — 75810000293 HC SIMP/SUPERF WND  RPR

## 2021-07-16 PROCEDURE — 36415 COLL VENOUS BLD VENIPUNCTURE: CPT

## 2021-07-16 PROCEDURE — 93005 ELECTROCARDIOGRAM TRACING: CPT

## 2021-07-16 PROCEDURE — 72170 X-RAY EXAM OF PELVIS: CPT

## 2021-07-16 PROCEDURE — 84703 CHORIONIC GONADOTROPIN ASSAY: CPT

## 2021-07-16 PROCEDURE — 99283 EMERGENCY DEPT VISIT LOW MDM: CPT

## 2021-07-16 PROCEDURE — 82077 ASSAY SPEC XCP UR&BREATH IA: CPT

## 2021-07-16 PROCEDURE — 75810000467 HC TRAUMA RESPONSE LVL III PARITIAL ACTIVATION

## 2021-07-16 PROCEDURE — 71045 X-RAY EXAM CHEST 1 VIEW: CPT

## 2021-07-16 PROCEDURE — 72125 CT NECK SPINE W/O DYE: CPT

## 2021-07-16 PROCEDURE — 70450 CT HEAD/BRAIN W/O DYE: CPT

## 2021-07-16 PROCEDURE — 85027 COMPLETE CBC AUTOMATED: CPT

## 2021-07-16 PROCEDURE — 70486 CT MAXILLOFACIAL W/O DYE: CPT

## 2021-07-16 PROCEDURE — 80048 BASIC METABOLIC PNL TOTAL CA: CPT

## 2021-07-16 RX ORDER — NALOXONE HYDROCHLORIDE 1 MG/ML
INJECTION INTRAMUSCULAR; INTRAVENOUS; SUBCUTANEOUS
Status: DISCONTINUED
Start: 2021-07-16 | End: 2021-07-16 | Stop reason: WASHOUT

## 2021-07-16 RX ORDER — ACETAMINOPHEN 500 MG
1000 TABLET ORAL ONCE
Status: DISCONTINUED | OUTPATIENT
Start: 2021-07-16 | End: 2021-07-17 | Stop reason: HOSPADM

## 2021-07-16 RX ORDER — ONDANSETRON 2 MG/ML
INJECTION INTRAMUSCULAR; INTRAVENOUS
Status: DISCONTINUED
Start: 2021-07-16 | End: 2021-07-16 | Stop reason: WASHOUT

## 2021-07-16 RX ORDER — LIDOCAINE HYDROCHLORIDE 10 MG/ML
10 INJECTION INFILTRATION; PERINEURAL ONCE
Status: DISCONTINUED | OUTPATIENT
Start: 2021-07-16 | End: 2021-07-17 | Stop reason: HOSPADM

## 2021-07-16 RX ORDER — MORPHINE SULFATE 2 MG/ML
2 INJECTION, SOLUTION INTRAMUSCULAR; INTRAVENOUS ONCE
Status: DISCONTINUED | OUTPATIENT
Start: 2021-07-16 | End: 2021-07-16

## 2021-07-16 NOTE — ED PROVIDER NOTES
EMERGENCY DEPARTMENT HISTORY AND PHYSICAL EXAM        Date: 2021  Patient Name: Gale Yo    History of Presenting Illness     Chief Complaint   Patient presents with    Head Injury       History Provided By: Patient and Patient significant other    HPI: Gale Yo, 38 y.o. female with history of depression who presents with fall. Patient fell down 4 steps that were broke. She hit her head at the bottom. She did have brief loss of consciousness for a couple of seconds. She does some confusion afterwards according to her significant other. She is now at her baseline. She has laceration to her forehead above her nose. She has \"pain all over\". Pain is severe, nonradiating, constant, achy. She does have a mild headache. Denies any neck pain.     PCP: Taco Pina MD    Current Facility-Administered Medications   Medication Dose Route Frequency Provider Last Rate Last Admin    naloxone (NARCAN) 1 mg/mL injection             ondansetron (ZOFRAN) 4 mg/2 mL injection             lidocaine (XYLOCAINE) 10 mg/mL (1 %) injection 10 mL  10 mL IntraDERMal ONCE Ady Finch DO        acetaminophen (TYLENOL) tablet 1,000 mg  1,000 mg Oral ONCE Zelda Labrum, DO           Past History     Past Medical History:  Past Medical History:   Diagnosis Date    Depressive disorder, not elsewhere classified 2014       Past Surgical History:  Past Surgical History:   Procedure Laterality Date    HX GYN          HX OTHER SURGICAL      fibroid removal left breast    [place] BREAST SURGERY PROCEDURE UNLISTED      cyst removal       Family History:  Family History   Problem Relation Age of Onset    Malignant Hyperthermia Neg Hx     Pseudocholinesterase Deficiency Neg Hx     Delayed Awakening Neg Hx     Post-op Nausea/Vomiting Neg Hx     Emergence Delirium Neg Hx     Post-op Cognitive Dysfunction Neg Hx     Other Neg Hx        Social History:  Social History     Tobacco Use    Smoking status: Current Every Day Smoker     Packs/day: 0.50     Years: 10.00     Pack years: 5.00    Smokeless tobacco: Current User   Substance Use Topics    Alcohol use: Yes     Comment: socially    Drug use: Yes     Types: Heroin, Marijuana, Cocaine     Comment: lasted used 1 year ago       Allergies: Allergies   Allergen Reactions    Tramadol Rash         Review of Systems   Review of Systems   Constitutional: Negative for fever. HENT: Negative for congestion. Eyes: Negative for visual disturbance. Respiratory: Negative for shortness of breath. Cardiovascular: Negative for chest pain. Gastrointestinal: Negative for abdominal pain. Genitourinary: Negative for dysuria. Musculoskeletal: Negative for arthralgias. Skin: Negative for rash. Neurological: Positive for headaches. Physical Exam   Constitutional: No acute distress. Well-nourished. Skin: No rash. ENT: No rhinorrhea. No cough. Laceration to the face above the nose that is linear and measures about 5 cm and a superficial.  No active bleeding. Nose appears to be intact with some minimal swelling. Nasal septum is in appropriate position. Eye: No proptosis or conjunctival injections. Respiratory: No apparent respiratory distress. Gastrointestinal: Nondistended. No abdominal tenderness. Musculoskeletal: No obvious bony deformities. Pelvis is stable. No tenderness to the extremities. No rib cage tenderness. Psychiatric: Cooperative. Appropriate mood and affect.     Diagnostic Study Results     Labs -     Recent Results (from the past 24 hour(s))   METABOLIC PANEL, BASIC    Collection Time: 07/16/21  5:15 PM   Result Value Ref Range    Sodium 140 136 - 145 mmol/L    Potassium 4.1 3.5 - 5.1 mmol/L    Chloride 108 97 - 108 mmol/L    CO2 27 21 - 32 mmol/L    Anion gap 5 5 - 15 mmol/L    Glucose 80 65 - 100 mg/dL    BUN 10 6 - 20 mg/dL    Creatinine 0.89 0.55 - 1.02 mg/dL    BUN/Creatinine ratio 11 (L) 12 - 20      GFR est AA >60 >60 ml/min/1.73m2    GFR est non-AA >60 >60 ml/min/1.73m2    Calcium 9.3 8.5 - 10.1 mg/dL   CBC W/O DIFF    Collection Time: 07/16/21  5:15 PM   Result Value Ref Range    WBC 6.6 3.6 - 11.0 K/uL    RBC 4.88 3.80 - 5.20 M/uL    HGB 13.6 11.5 - 16.0 g/dL    HCT 42.4 35.0 - 47.0 %    MCV 86.9 80.0 - 99.0 FL    MCH 27.9 26.0 - 34.0 PG    MCHC 32.1 30.0 - 36.5 g/dL    RDW 14.6 (H) 11.5 - 14.5 %    PLATELET 635 494 - 970 K/uL    MPV 11.2 8.9 - 12.9 FL    NRBC 0.0 0.0  WBC    ABSOLUTE NRBC 0.00 0.00 - 0.01 K/uL   HCG QL SERUM    Collection Time: 07/16/21  5:15 PM   Result Value Ref Range    HCG, Ql. Negative Negative     ETHYL ALCOHOL    Collection Time: 07/16/21  5:15 PM   Result Value Ref Range    ALCOHOL(ETHYL),SERUM 8 <10 mg/dL       Radiologic Studies -   XR PELV AP ONLY   Final Result   No fracture or destructive lesion is seen. The joint spaces are   maintained, as are the adjacent soft tissues. Osteitis condense vinay I on the   right. XR CHEST SNGL V   Final Result   The cardiomediastinal silhouette is appropriate for age, technique,   and lung expansion. Pulmonary vasculature is not congested. The lungs are   essentially clear. No effusion or pneumothorax is seen. CT HEAD WO CONT    (Results Pending)   CT MAXILLOFACIAL WO CONT    (Results Pending)   CT SPINE CERV WO CONT    (Results Pending)     CT Results  (Last 48 hours)    None        CXR Results  (Last 48 hours)               07/16/21 1713  XR CHEST SNGL V Final result    Impression:  The cardiomediastinal silhouette is appropriate for age, technique,   and lung expansion. Pulmonary vasculature is not congested. The lungs are   essentially clear. No effusion or pneumothorax is seen. Narrative:  1 view                 Medical Decision Making and ED Course     I reviewed the available vital signs, nursing notes, past medical history, past surgical history, family history, and social history.     Vital Signs - Reviewed the patient's vital signs. Patient Vitals for the past 12 hrs:   Temp Pulse Resp BP SpO2   07/16/21 1730    117/81    07/16/21 1708  76 15 (!) 127/90 99 %   07/16/21 1656 98.4 °F (36.9 °C) 76 18 116/79 99 %       EKG interpretation: Obtained on 7/16/2021 at 1702. Read at [id number]. Normal sinus rhythm at rate of 60 bpm.  Normal MO interval, QRS duration, QTc interval.  No ST segment abnormalities. Normal axis. Medical Decision Making:   Presented with fall. The differential diagnosis is fall, laceration, head injury, cervical spinal injury, chest injury, pelvic injury, polysubstance abuse, syncope. Work-up is negative so far. I am waiting on the CT scan reads. I do not suspect any significant injury from her fall. She does have a laceration that was repaired by the physician assistant. Patient will likely be discharged home if her work-up is negative. She is currently well-appearing with no neurological deficits. Procedures     Critical Care Note:   5:10 PM  Amount of critical care time: 30 minutes  Impending deterioration: CNS  Associated risk factors: Trauma  Management: Bedside Assessment and Supervision of Care  Interpretation: Xrays and CT Scan  Interventions: Observation  Case review: N/A  Treatment response: Stable  Performed by: Self  Notes: I have spent critical care time involved in lab review, decision making, bedside attention, and documentation. This time excludes time spent in any separate billed procedures. During this entire length of time I was immediately available to the patient. Ed Dallas, DO    Disposition     Discharged home    DISCHARGE PLAN:  1. There are no discharge medications for this patient.     2.   Follow-up Information     Follow up With Specialties Details Why [address] [place] [address] EMERGENCY DEPT Emergency Medicine Go today As soon as possible if symptoms worsen [address] [facility] [address] [phone]    Primary care doctor  Schedule an appointment as soon as possible for a visit in 1 week For suture removal         3. Return to ED if worse     Diagnosis     Clinical impression:   1. Fall, initial encounter    2. Facial laceration, initial encounter        Attestation:  Please note that this dictation was completed with [email or web address], the computer voice recognition software. Quite often unanticipated grammatical, syntax, homophones, and other interpretive errors are inadvertently transcribed by the computer software. Please disregard these errors. Please excuse any errors that have escaped final proofreading. Thank you.   Jacy Mendoza, DO

## 2021-07-16 NOTE — ED NOTES
Wound Repair    Date/Time: 7/16/2021 7:28 PM  Performed by: PAPreparation: skin prepped with Betadine  Pre-procedure re-eval: Immediately prior to the procedure, the patient was reevaluated and found suitable for the planned procedure and any planned medications. Time out: Immediately prior to the procedure a time out was called to verify the correct patient, procedure, equipment, staff and marking as appropriate. .  Location: forehead. Wound length:2.6 - 7.5 cm  Anesthesia: local infiltration    Anesthesia:  Local Anesthetic: lidocaine 1% without epinephrine  Anesthetic total: 3 mL  Foreign bodies: no foreign bodies  Irrigation solution: saline  Irrigation method: syringe  Debridement: none  Skin closure: 5-0 nylon  Number of sutures: 7  Technique: simple  Approximation: close  Dressing: nonstick dressing/tape. Patient tolerance: patient tolerated the procedure well with no immediate complications  My total time at bedside, performing this procedure was 1-15 minutes.       Wade Gosselin, PA

## 2021-07-16 NOTE — ED NOTES
Patient was lethargic and difficult to arouse with decreased respirations. Nurse asked the patient screening questions about drugs and safety. Patient reports she feels safe at home but admitted to heroin use.  PRN order for narcan was obtained, will continue to monitor patient condition

## 2021-07-17 NOTE — ED NOTES
Pt ready to leave. Pt signed AMA form. Dr. Shira Vicente notified. Pt escorted out with 1 adult male. Pt in NAD ambulates with a steady gait.

## 2021-07-19 LAB
ATRIAL RATE: 73 BPM
CALCULATED P AXIS, ECG09: 53 DEGREES
CALCULATED R AXIS, ECG10: 73 DEGREES
CALCULATED T AXIS, ECG11: 59 DEGREES
DIAGNOSIS, 93000: NORMAL
P-R INTERVAL, ECG05: 158 MS
Q-T INTERVAL, ECG07: 390 MS
QRS DURATION, ECG06: 78 MS
QTC CALCULATION (BEZET), ECG08: 414 MS
VENTRICULAR RATE, ECG03: 68 BPM

## 2021-07-28 ENCOUNTER — HOSPITAL ENCOUNTER (EMERGENCY)
Age: 36
Discharge: HOME | End: 2021-07-28
Attending: EMERGENCY MEDICINE
Payer: MEDICAID

## 2021-07-28 VITALS
OXYGEN SATURATION: 97 % | SYSTOLIC BLOOD PRESSURE: 143 MMHG | WEIGHT: 147 LBS | HEART RATE: 108 BPM | BODY MASS INDEX: 25.1 KG/M2 | HEIGHT: 64 IN | TEMPERATURE: 99 F | DIASTOLIC BLOOD PRESSURE: 102 MMHG | RESPIRATION RATE: 16 BRPM

## 2021-07-28 DIAGNOSIS — I10 HYPERTENSION, UNSPECIFIED TYPE: ICD-10-CM

## 2021-07-28 DIAGNOSIS — Z48.02 ENCOUNTER FOR REMOVAL OF SUTURES: Primary | ICD-10-CM

## 2021-07-28 PROCEDURE — 99282 EMERGENCY DEPT VISIT SF MDM: CPT

## 2021-07-28 NOTE — ED PROVIDER NOTES
EMERGENCY DEPARTMENT HISTORY AND PHYSICAL EXAM    Date: 2021  Patient Name: Marisa Brewer    History of Presenting Illness     Chief Complaint   Patient presents with    Suture Removal         History Provided By: Patient    HPI: Marisa Brewer is a 38 y.o. female with a PMH of Depression who presents with suture removal.  Patient sustained laceration on 2021 after a head injury following a fall. She received 7 sutures to her forehead. Patient reports applying antibiotic ointment occasionally to wound. Denies fever, chills, swelling, drainage. PCP: Angel Burger MD        Past History     Past Medical History:  Past Medical History:   Diagnosis Date    Depressive disorder, not elsewhere classified 2014       Past Surgical History:  Past Surgical History:   Procedure Laterality Date    HX GYN          HX OTHER SURGICAL      fibroid removal left breast    [place] BREAST SURGERY PROCEDURE UNLISTED      cyst removal       Family History:  Family History   Problem Relation Age of Onset    Malignant Hyperthermia Neg Hx     Pseudocholinesterase Deficiency Neg Hx     Delayed Awakening Neg Hx     Post-op Nausea/Vomiting Neg Hx     Emergence Delirium Neg Hx     Post-op Cognitive Dysfunction Neg Hx     Other Neg Hx        Social History:  Social History     Tobacco Use    Smoking status: Current Every Day Smoker     Packs/day: 0.50     Years: 10.00     Pack years: 5.00    Smokeless tobacco: Current User   Substance Use Topics    Alcohol use: Yes     Comment: socially    Drug use: Yes     Types: Heroin, Marijuana, Cocaine     Comment: lasted used 1 year ago       Allergies: Allergies   Allergen Reactions    Tramadol Rash    Tylenol [Acetaminophen] Swelling         Review of Systems   Review of Systems   Constitutional: Negative for appetite change, chills, fatigue and fever. HENT: Negative for congestion, ear pain and rhinorrhea. Eyes: Negative for pain and itching. Respiratory: Negative for shortness of breath. Cardiovascular: Negative for chest pain. Musculoskeletal: Negative for arthralgias. Skin: Positive for wound. All other systems reviewed and are negative. Physical Exam     Vitals:    07/28/21 1212 07/28/21 1254   BP: (!) 150/106 (!) 143/102   Pulse: (!) 113 (!) 108   Resp: 16    Temp: 99 °F (37.2 °C)    SpO2: 97%    Weight: 66.7 kg (147 lb)    Height: 5' 4\" (1.626 m)      Physical Exam  Vitals and nursing note reviewed. Constitutional:       General: She is not in acute distress. Appearance: She is well-developed. She is not ill-appearing. Cardiovascular:      Rate and Rhythm: Normal rate and regular rhythm. Pulses: Normal pulses. Heart sounds: Normal heart sounds. Pulmonary:      Effort: Pulmonary effort is normal.      Breath sounds: Normal breath sounds. Skin:     Comments: 6 intact sutures noted   Neurological:      Mental Status: She is alert and oriented to person, place, and time. Diagnostic Study Results     Labs -   No results found for this or any previous visit (from the past 12 hour(s)). Radiologic Studies -   No orders to display     CT Results  (Last 48 hours)    None        CXR Results  (Last 48 hours)    None            Medical Decision Making   I am the first provider for this patient. I reviewed the vital signs, available nursing notes, past medical history, past surgical history, family history and social history. Vital Signs-Reviewed the patient's vital signs. Records Reviewed: Nursing Notes and Old Medical Records    Provider Notes (Medical Decision Making):     DDX;wound dehiscence, cellulitis, suture removal    HYPERTENSION COUNSELING  For 10 minutes, education was provided to the patient today regarding their hypertension. Patient is made aware of their elevated blood pressure and is instructed to follow up with their PCP.  Patient is counseled regarding consequences of chronic, uncontrolled hypertension including kidney disease, heart disease, stroke or even death. Patient states their understanding and agrees to follow up this week. I reviewed heart healthy diet in addition to exercise for blood pressure management. The patient verbalized understanding          Disposition:  Discharge     DISCHARGE NOTE:         Care plan outlined and precautions discussed. Patient has no new complaints, changes, or physical findings. . All of pt's questions and concerns were addressed. Patient was instructed and agrees to follow up with PCP, as well as to return to the ED upon further deterioration. Patient is ready to go home. Follow-up Information     Follow up With Specialties Details Why Contact Info    Giselle Em MD Internal Medicine  As needed, If symptoms worsen [address] [place] [address]  [phone]            There are no discharge medications for this patient. Procedures:  Suture/Staple Removal    Date/Time: 7/28/2021 3:57 PM  Performed by: Jamshid Boston NP  Authorized by: Jamshid Boston NP     Consent:     Consent obtained:  Verbal    Consent given by:  Patient    Risks discussed:  Pain, wound separation and bleeding  Location:     Location:  Head/neck    Head/neck location:  Forehead  Procedure details:     Wound appearance:  No signs of infection    Number of sutures removed:  6  Post-procedure details:     Patient tolerance of procedure: Tolerated well, no immediate complications        Please note that this dictation was completed with Dragon, computer voice recognition software. Quite often unanticipated grammatical, syntax, homophones, and other interpretive errors are inadvertently transcribed by the computer software. Please disregard these errors. Additionally, please excuse any errors that have escaped final proofreading. Diagnosis     Clinical Impression:   1. Encounter for removal of sutures    2.  Hypertension, unspecified type

## 2021-07-28 NOTE — ED NOTES
Discharge Instructions Reviewed with patient per this nurse. Discharge instructions given to patient per this nurse. Patient able to return verbalize discharge instructions. Paper copy of discharge instructions given. No Rx sent electronically to pharmacy on record. Patient condition stable, Respiratory status WNL, Neurostatus intact.  Ambulatory out of er, to home with self

## 2021-07-28 NOTE — DISCHARGE INSTRUCTIONS
It was a pleasure taking care of you at [facility] Emergency Department today. We know that when you come to [facility], you are entrusting us with your health, comfort, and safety. Our physicians and nurses honor that trust, and we truly appreciate the opportunity to care for you and your loved ones. We also value our feedback. If you receive a survey about your Emergency Department experience today, please fill it out. We care about our patients' feedback, and we listen to what you have to say. Thank you!

## 2021-08-12 PROCEDURE — 99283 EMERGENCY DEPT VISIT LOW MDM: CPT

## 2021-08-12 PROCEDURE — 96372 THER/PROPH/DIAG INJ SC/IM: CPT

## 2021-08-13 ENCOUNTER — HOSPITAL ENCOUNTER (EMERGENCY)
Age: 36
Discharge: HOME | End: 2021-08-13
Attending: EMERGENCY MEDICINE
Payer: MEDICAID

## 2021-08-13 VITALS
WEIGHT: 140 LBS | DIASTOLIC BLOOD PRESSURE: 83 MMHG | RESPIRATION RATE: 18 BRPM | HEART RATE: 91 BPM | BODY MASS INDEX: 23.9 KG/M2 | SYSTOLIC BLOOD PRESSURE: 126 MMHG | HEIGHT: 64 IN | TEMPERATURE: 99.5 F | OXYGEN SATURATION: 100 %

## 2021-08-13 DIAGNOSIS — N39.0 URINARY TRACT INFECTION WITHOUT HEMATURIA, SITE UNSPECIFIED: ICD-10-CM

## 2021-08-13 DIAGNOSIS — R51.9 NONINTRACTABLE HEADACHE, UNSPECIFIED CHRONICITY PATTERN, UNSPECIFIED HEADACHE TYPE: Primary | ICD-10-CM

## 2021-08-13 LAB
APPEARANCE UR: ABNORMAL
BACTERIA URNS QL MICRO: ABNORMAL /HPF
BILIRUB UR QL: NEGATIVE
COLOR UR: ABNORMAL
EPITH CASTS URNS QL MICRO: ABNORMAL /LPF
FLUAV RNA SPEC QL NAA+PROBE: NOT DETECTED
FLUBV RNA SPEC QL NAA+PROBE: NOT DETECTED
GLUCOSE UR STRIP.AUTO-MCNC: NEGATIVE MG/DL
HGB UR QL STRIP: ABNORMAL
KETONES UR QL STRIP.AUTO: NEGATIVE MG/DL
LEUKOCYTE ESTERASE UR QL STRIP.AUTO: ABNORMAL
NITRITE UR QL STRIP.AUTO: NEGATIVE
PH UR STRIP: 6 [PH] (ref 5–8)
PROT UR STRIP-MCNC: 30 MG/DL
RBC #/AREA URNS HPF: ABNORMAL /HPF (ref 0–5)
SARS-COV-2, COV2: NOT DETECTED
SP GR UR REFRACTOMETRY: 1.01 (ref 1–1.03)
UROBILINOGEN UR QL STRIP.AUTO: 2 EU/DL (ref 0.2–1)
WBC URNS QL MICRO: ABNORMAL /HPF (ref 0–4)

## 2021-08-13 PROCEDURE — 87636 SARSCOV2 & INF A&B AMP PRB: CPT

## 2021-08-13 PROCEDURE — 81001 URINALYSIS AUTO W/SCOPE: CPT

## 2021-08-13 PROCEDURE — 74011250636 HC RX REV CODE- 250/636: Performed by: EMERGENCY MEDICINE

## 2021-08-13 RX ORDER — KETOROLAC TROMETHAMINE 30 MG/ML
30 INJECTION, SOLUTION INTRAMUSCULAR; INTRAVENOUS
Status: COMPLETED | OUTPATIENT
Start: 2021-08-13 | End: 2021-08-13

## 2021-08-13 RX ORDER — CEPHALEXIN 500 MG/1
500 CAPSULE ORAL 4 TIMES DAILY
Qty: 28 CAPSULE | Refills: 0 | Status: SHIPPED | OUTPATIENT
Start: 2021-08-13 | End: 2021-08-20

## 2021-08-13 RX ADMIN — KETOROLAC TROMETHAMINE 30 MG: 30 INJECTION, SOLUTION INTRAMUSCULAR; INTRAVENOUS at 01:05

## 2021-08-13 NOTE — ED NOTES
Pt presents ambulatory with c/o headache x 5 days. Pt reports photophobia, occasional lightheadedness, fatigue, and feeling \"hot. \" Pt temp during assessment was 99.5 F. Pt denies vomiting and diarrhea. Pt has hx of tension headaches. Pt has not been vaccinated for COVID. Pt is alert and oriented x 4. RR even and unlabored. Call bell is within reach. Plan of care has been discussed, pt has no questions or concerns at this time. Emergency Department Nursing Plan of Care       The Nursing Plan of Care is developed from the Nursing assessment and Emergency Department Attending provider initial evaluation. The plan of care may be reviewed in the ED Provider note.     The Plan of Care was developed with the following considerations:   Patient / Family readiness to learn indicated by:verbalized understanding  Persons(s) to be included in education: patient  Barriers to Learning/Limitations:No    Signed     Debra Morrison RN    8/13/2021   12:46 AM

## 2021-08-13 NOTE — ED PROVIDER NOTES
56-year-old female presents with 4 to 5 days of frontal headache and eye pain. Mild photosensitivity. Also describes fatigue, subjective fevers and chills, cough. Denies neck pain. Denies runny nose. Pain behind eyes is worse with leaning forward. Urine is dark. Was just recently started on methadone at methadone clinic. Past Medical History:   Diagnosis Date    Depressive disorder, not elsewhere classified 2014       Past Surgical History:   Procedure Laterality Date    HX GYN          HX OTHER SURGICAL      fibroid removal left breast    [place] BREAST SURGERY PROCEDURE UNLISTED      cyst removal         Family History:   Problem Relation Age of Onset    Malignant Hyperthermia Neg Hx     Pseudocholinesterase Deficiency Neg Hx     Delayed Awakening Neg Hx     Post-op Nausea/Vomiting Neg Hx     Emergence Delirium Neg Hx     Post-op Cognitive Dysfunction Neg Hx     Other Neg Hx        Social History     Socioeconomic History    Marital status: SINGLE     Spouse name: Not on file    Number of children: Not on file    Years of education: Not on file    Highest education level: Not on file   Occupational History    Not on file   Tobacco Use    Smoking status: Current Every Day Smoker     Packs/day: 0.50     Years: 10.00     Pack years: 5.00    Smokeless tobacco: Current User    Tobacco comment: \"rarely. . maybe 2 or 3 cigarettes a day\"   Substance and Sexual Activity    Alcohol use: Yes     Comment: socially    Drug use: Not Currently     Types: Heroin, Marijuana, Cocaine     Comment: started at the methadone clinic 2021    Sexual activity: Yes     Partners: Female     Birth control/protection: Condom   Other Topics Concern    Not on file   Social History Narrative    Not on file     Social Determinants of Health     Financial Resource Strain:     Difficulty of Paying Living Expenses:    Food Insecurity:     Worried About Running Out of Food in the Last Year:     Ran Out of Food in the Last Year:    Transportation Needs:     Lack of Transportation (Medical):  Lack of Transportation (Non-Medical):    Physical Activity:     Days of Exercise per Week:     Minutes of Exercise per Session:    Stress:     Feeling of Stress :    Social Connections:     Frequency of Communication with Friends and Family:     Frequency of Social Gatherings with Friends and Family:     Attends [employer]ism Services:     Active Member of Clubs or Organizations:     Attends Club or Organization Meetings:     Marital Status:    Intimate Partner Violence:     Fear of Current or Ex-Partner:     Emotionally Abused:     Physically Abused:     Sexually Abused: ALLERGIES: Tramadol and Tylenol [acetaminophen]    Review of Systems   Constitutional: Positive for chills, fatigue and fever. Negative for unexpected weight change. HENT: Negative. Negative for congestion and trouble swallowing. Eyes: Negative for discharge. Respiratory: Positive for cough. Negative for chest tightness and shortness of breath. Cardiovascular: Negative. Negative for chest pain. Gastrointestinal: Negative. Negative for abdominal distention, abdominal pain, constipation, diarrhea and nausea. Endocrine: Negative. Genitourinary: Negative. Negative for difficulty urinating, dysuria, frequency and urgency. Musculoskeletal: Negative. Negative for arthralgias and myalgias. Skin: Negative. Negative for color change. Allergic/Immunologic: Negative. Neurological: Positive for headaches. Negative for dizziness and speech difficulty. Hematological: Negative. Psychiatric/Behavioral: Negative. Negative for agitation and confusion. All other systems reviewed and are negative.       Vitals:    08/12/21 2301 08/12/21 2304 08/13/21 0041   BP: (!) 149/93  126/83   Pulse: (!) 115 (!) 109 91   Resp: 18  18   Temp: 98.8 °F (37.1 °C)  99.5 °F (37.5 °C)   SpO2: 100%  100%   Weight: 63.5 kg (140 lb) Height: 5' 4\" (1.626 m)              Physical Exam  Vitals and nursing note reviewed. Constitutional:       Appearance: She is well-developed. HENT:      Head: Normocephalic and atraumatic. Head contusion: mild frontal sinus ttp. Eyes:      Conjunctiva/sclera: Conjunctivae normal.   Cardiovascular:      Rate and Rhythm: Normal rate and regular rhythm. Pulmonary:      Effort: Pulmonary effort is normal. No respiratory distress. Abdominal:      Palpations: Abdomen is soft. Tenderness: There is no abdominal tenderness. Musculoskeletal:         General: No deformity. Normal range of motion. Cervical back: Neck supple. No rigidity. Skin:     General: Skin is warm and dry. Neurological:      Mental Status: She is alert and oriented to person, place, and time. Psychiatric:         Behavior: Behavior normal.         Thought Content: Thought content normal.          MDM  Number of Diagnoses or Management Options  Diagnosis management comments: ddx - Sinus headache, viral syndrome, tension headache, migraine headache, covid, flu, uti         Procedures    LABORATORY TESTS:  No results found for this or any previous visit (from the past 12 hour(s)). IMAGING RESULTS:  No orders to display       MEDICATIONS GIVEN:  Medications   ketorolac (TORADOL) injection 30 mg (30 mg IntraMUSCular Given 8/13/21 0105)       IMPRESSION:  1. Nonintractable headache, unspecified chronicity pattern, unspecified headache type    2. Urinary tract infection without hematuria, site unspecified        PLAN:  1. Discharge Medication List as of 8/13/2021  7:42 AM        2.    Follow-up Information    None       Return to ED if worse

## 2021-08-13 NOTE — ED TRIAGE NOTES
Pt comes in ambulatory with c.o HA and chills x 3 days. Pt has not been vaccinated against COVID -19.

## 2022-03-19 PROBLEM — F31.9 BIPOLAR DISORDER (HCC): Status: ACTIVE | Noted: 2017-12-12

## 2022-03-19 PROBLEM — F19.94 SUBSTANCE INDUCED MOOD DISORDER (HCC): Status: ACTIVE | Noted: 2017-12-12

## 2024-04-18 ENCOUNTER — APPOINTMENT (OUTPATIENT)
Facility: HOSPITAL | Age: 39
End: 2024-04-18
Payer: MEDICAID

## 2024-04-18 ENCOUNTER — HOSPITAL ENCOUNTER (EMERGENCY)
Facility: HOSPITAL | Age: 39
Discharge: HOME | End: 2024-04-18
Attending: EMERGENCY MEDICINE
Payer: MEDICAID

## 2024-04-18 VITALS
TEMPERATURE: 97.5 F | HEIGHT: 65 IN | BODY MASS INDEX: 27.16 KG/M2 | OXYGEN SATURATION: 97 % | DIASTOLIC BLOOD PRESSURE: 65 MMHG | WEIGHT: 163 LBS | SYSTOLIC BLOOD PRESSURE: 110 MMHG | RESPIRATION RATE: 16 BRPM | HEART RATE: 88 BPM

## 2024-04-18 DIAGNOSIS — N83.201 CYST OF RIGHT OVARY: ICD-10-CM

## 2024-04-18 DIAGNOSIS — D25.9 UTERINE LEIOMYOMA, UNSPECIFIED LOCATION: Primary | ICD-10-CM

## 2024-04-18 LAB
ALBUMIN SERPL-MCNC: 3.3 G/DL (ref 3.5–5)
ALBUMIN/GLOB SERPL: 0.8 (ref 1.1–2.2)
ALP SERPL-CCNC: 79 U/L (ref 45–117)
ALT SERPL-CCNC: 82 U/L (ref 12–78)
ANION GAP SERPL CALC-SCNC: 6 MMOL/L (ref 5–15)
APPEARANCE UR: ABNORMAL
AST SERPL-CCNC: 85 U/L (ref 15–37)
BACTERIA URNS QL MICRO: ABNORMAL /HPF
BASOPHILS # BLD: 0.1 K/UL (ref 0–0.1)
BASOPHILS NFR BLD: 1 % (ref 0–1)
BILIRUB SERPL-MCNC: 0.6 MG/DL (ref 0.2–1)
BILIRUB UR QL: NEGATIVE
BUN SERPL-MCNC: 10 MG/DL (ref 6–20)
BUN/CREAT SERPL: 11 (ref 12–20)
CALCIUM SERPL-MCNC: 9.1 MG/DL (ref 8.5–10.1)
CHLORIDE SERPL-SCNC: 102 MMOL/L (ref 97–108)
CO2 SERPL-SCNC: 29 MMOL/L (ref 21–32)
COLOR UR: ABNORMAL
CREAT SERPL-MCNC: 0.95 MG/DL (ref 0.55–1.02)
DIFFERENTIAL METHOD BLD: ABNORMAL
EOSINOPHIL # BLD: 0.2 K/UL (ref 0–0.4)
EOSINOPHIL NFR BLD: 2 % (ref 0–7)
EPITH CASTS URNS QL MICRO: ABNORMAL /LPF
ERYTHROCYTE [DISTWIDTH] IN BLOOD BY AUTOMATED COUNT: 16.7 % (ref 11.5–14.5)
GLOBULIN SER CALC-MCNC: 4.2 G/DL (ref 2–4)
GLUCOSE SERPL-MCNC: 93 MG/DL (ref 65–100)
GLUCOSE UR STRIP.AUTO-MCNC: NEGATIVE MG/DL
HCG UR QL: NEGATIVE
HCT VFR BLD AUTO: 38.5 % (ref 35–47)
HGB BLD-MCNC: 11.9 G/DL (ref 11.5–16)
HGB UR QL STRIP: NEGATIVE
IMM GRANULOCYTES # BLD AUTO: 0 K/UL (ref 0–0.04)
IMM GRANULOCYTES NFR BLD AUTO: 0 % (ref 0–0.5)
KETONES UR QL STRIP.AUTO: NEGATIVE MG/DL
LEUKOCYTE ESTERASE UR QL STRIP.AUTO: ABNORMAL
LYMPHOCYTES # BLD: 1.9 K/UL (ref 0.8–3.5)
LYMPHOCYTES NFR BLD: 19 % (ref 12–49)
MCH RBC QN AUTO: 24.5 PG (ref 26–34)
MCHC RBC AUTO-ENTMCNC: 30.9 G/DL (ref 30–36.5)
MCV RBC AUTO: 79.2 FL (ref 80–99)
MONOCYTES # BLD: 0.5 K/UL (ref 0–1)
MONOCYTES NFR BLD: 5 % (ref 5–13)
NEUTS SEG # BLD: 7.2 K/UL (ref 1.8–8)
NEUTS SEG NFR BLD: 73 % (ref 32–75)
NITRITE UR QL STRIP.AUTO: NEGATIVE
NRBC # BLD: 0 K/UL (ref 0–0.01)
NRBC BLD-RTO: 0 PER 100 WBC
PH UR STRIP: 6 (ref 5–8)
PLATELET # BLD AUTO: 310 K/UL (ref 150–400)
PMV BLD AUTO: 11.9 FL (ref 8.9–12.9)
POTASSIUM SERPL-SCNC: 4.4 MMOL/L (ref 3.5–5.1)
PROT SERPL-MCNC: 7.5 G/DL (ref 6.4–8.2)
PROT UR STRIP-MCNC: NEGATIVE MG/DL
RBC # BLD AUTO: 4.86 M/UL (ref 3.8–5.2)
RBC #/AREA URNS HPF: ABNORMAL /HPF (ref 0–5)
SODIUM SERPL-SCNC: 137 MMOL/L (ref 136–145)
SP GR UR REFRACTOMETRY: 1.02 (ref 1–1.03)
URINE CULTURE IF INDICATED: ABNORMAL
UROBILINOGEN UR QL STRIP.AUTO: 0.2 EU/DL (ref 0.2–1)
WBC # BLD AUTO: 10 K/UL (ref 3.6–11)
WBC URNS QL MICRO: ABNORMAL /HPF (ref 0–4)

## 2024-04-18 PROCEDURE — 81025 URINE PREGNANCY TEST: CPT

## 2024-04-18 PROCEDURE — 6360000002 HC RX W HCPCS: Performed by: EMERGENCY MEDICINE

## 2024-04-18 PROCEDURE — 81001 URINALYSIS AUTO W/SCOPE: CPT

## 2024-04-18 PROCEDURE — 87088 URINE BACTERIA CULTURE: CPT

## 2024-04-18 PROCEDURE — 87086 URINE CULTURE/COLONY COUNT: CPT

## 2024-04-18 PROCEDURE — 85025 COMPLETE CBC W/AUTO DIFF WBC: CPT

## 2024-04-18 PROCEDURE — 96374 THER/PROPH/DIAG INJ IV PUSH: CPT

## 2024-04-18 PROCEDURE — 74177 CT ABD & PELVIS W/CONTRAST: CPT

## 2024-04-18 PROCEDURE — 36415 COLL VENOUS BLD VENIPUNCTURE: CPT

## 2024-04-18 PROCEDURE — 87186 SC STD MICRODIL/AGAR DIL: CPT

## 2024-04-18 PROCEDURE — 99285 EMERGENCY DEPT VISIT HI MDM: CPT

## 2024-04-18 PROCEDURE — 6360000004 HC RX CONTRAST MEDICATION: Performed by: EMERGENCY MEDICINE

## 2024-04-18 PROCEDURE — 80053 COMPREHEN METABOLIC PANEL: CPT

## 2024-04-18 RX ORDER — HYDROCODONE BITARTRATE AND ACETAMINOPHEN 5; 325 MG/1; MG/1
1 TABLET ORAL EVERY 6 HOURS PRN
Qty: 6 TABLET | Refills: 0 | Status: SHIPPED | OUTPATIENT
Start: 2024-04-18 | End: 2024-04-20

## 2024-04-18 RX ORDER — DOCUSATE SODIUM 100 MG/1
100 CAPSULE, LIQUID FILLED ORAL 2 TIMES DAILY
Qty: 60 CAPSULE | Refills: 0 | Status: SHIPPED | OUTPATIENT
Start: 2024-04-18 | End: 2024-05-18

## 2024-04-18 RX ORDER — KETOROLAC TROMETHAMINE 30 MG/ML
30 INJECTION, SOLUTION INTRAMUSCULAR; INTRAVENOUS
Status: COMPLETED | OUTPATIENT
Start: 2024-04-18 | End: 2024-04-18

## 2024-04-18 RX ORDER — IBUPROFEN 800 MG/1
800 TABLET ORAL EVERY 8 HOURS PRN
Qty: 30 TABLET | Refills: 0 | Status: SHIPPED | OUTPATIENT
Start: 2024-04-18

## 2024-04-18 RX ADMIN — KETOROLAC TROMETHAMINE 30 MG: 30 INJECTION, SOLUTION INTRAMUSCULAR; INTRAVENOUS at 13:49

## 2024-04-18 RX ADMIN — IOPAMIDOL 100 ML: 755 INJECTION, SOLUTION INTRAVENOUS at 15:20

## 2024-04-18 ASSESSMENT — PAIN SCALES - GENERAL: PAINLEVEL_OUTOF10: 10

## 2024-04-18 ASSESSMENT — PAIN DESCRIPTION - LOCATION: LOCATION: ABDOMEN

## 2024-04-18 ASSESSMENT — PAIN DESCRIPTION - DESCRIPTORS: DESCRIPTORS: CRAMPING

## 2024-04-18 ASSESSMENT — PAIN DESCRIPTION - ORIENTATION: ORIENTATION: RIGHT;LOWER

## 2024-04-18 ASSESSMENT — LIFESTYLE VARIABLES
HOW MANY STANDARD DRINKS CONTAINING ALCOHOL DO YOU HAVE ON A TYPICAL DAY: PATIENT DOES NOT DRINK
HOW OFTEN DO YOU HAVE A DRINK CONTAINING ALCOHOL: NEVER

## 2024-04-18 ASSESSMENT — PAIN - FUNCTIONAL ASSESSMENT: PAIN_FUNCTIONAL_ASSESSMENT: 0-10

## 2024-04-18 NOTE — ED PROVIDER NOTES
[facility] EMERGENCY DEPT  EMERGENCY DEPARTMENT ENCOUNTER    Patient Name: Marian Manzano  MRN: [id number]  YOB: 1985  Provider: Mike Adan MD  PCP: Yara Pratt MD  Time/Date of evaluation: 1:39 PM EDT on 24    History of Presenting Illness     Chief Complaint   Patient presents with    Abdominal Pain     History Provided by: Patient   History is limited by: Nothing    HISTORY (Narrative):   Marian Manzano is a 38 y.o. female with a PMHX of major depressive disorder  who presents to the emergency department (room 8) by POV C/O suprapubic/right lower quadrant abdominal pain that radiates to her right pelvis for the past 4 days.  Patient denies any nausea, vomiting, diarrhea, fevers, or chills.  She also denies any dysuria or constipation.  She has been taking 800 mg of ibuprofen every 6-8 hours which does not completely relieve the pain but once the medicine wears off, the pain returns.  She denies any vaginal discharge.  Her last menstrual cycle was 2 weeks ago and unremarkable.  Her pain is worse if she lays on her left side.    Nursing Notes were all reviewed and agreed with or any disagreements were addressed in the HPI.    Past History     PAST MEDICAL HISTORY:  Past Medical History:   Diagnosis Date    Depressive disorder, not elsewhere classified 2014       PAST SURGICAL HISTORY:  Past Surgical History:   Procedure Laterality Date    BREAST SURGERY      cyst removal    GYN          OTHER SURGICAL HISTORY      fibroid removal left breast       FAMILY HISTORY:  Family History   Problem Relation Age of Onset    Malig Hypertherm Neg Hx     Post-op Cognitive Dysfunction Neg Hx     Emergence Delirium Neg Hx     Post-op Nausea/Vomiting Neg Hx     Delayed Awakening Neg Hx     Other Neg Hx     Pseudochol. Deficiency Neg Hx        SOCIAL HISTORY:  Social History     Tobacco Use    Smoking status: Every Day     Current packs/day: 0.50     Types: Cigarettes    Smokeless tobacco:

## 2024-04-18 NOTE — ED TRIAGE NOTES
Pt complaining of lower right abdominal pain that radiates into her groin area for the past four days. Pt denies N/V/D. Last BM today and normal. Pt denies any relevant PMH. Still has appendix

## 2024-04-18 NOTE — ED NOTES
Pt presents to ED complaining of R sided sharp, constant abdominal pain that radiates into her right thigh x 4 days. Pt denies N/V/D or any trauma to the area. Pain denies urinary symptoms. Pt has been taking 800 mg ibuprofen PRN and that has helped with the pain.  Pt still has appendix. Pt is alert and oriented x 4, RR even and unlabored, skin is warm and dry. Pt appears in NAD at this time. Assessment completed and pt updated on plan of care.  Call bell in reach.  Emergency Department Nursing Plan of Care  The Nursing Plan of Care is developed from the Nursing assessment and Emergency Department Attending provider initial evaluation.  The plan of care may be reviewed in the “ED Provider note”.  The Plan of Care was developed with the following considerations:  Patient / Family readiness to learn indicated by:Refer to Medical chart in [facility]  Persons(s) to be included in education: Refer to Medical chart in [facility]  Barriers to Learning/Limitations:Normal

## 2024-04-18 NOTE — ED NOTES
Discharge instructions were given to the patient by Alfred Mejía RN  .  The patient left the Emergency Department alert and oriented and in no acute distress with 3 prescription(s). The patient was encouraged to call or return to the ED for worsening issues or problems and was encouraged to schedule a follow up appointment for continuing care.  The patient verbalized understanding of discharge instructions and prescriptions; all questions were answered. The patient has no further concerns at this time.      Patient has been instructed that they have been given norco* which contains opioids, benzodiazepines, or other sedating drugs. Patient is aware that they will need to refrain from driving or operating heavy machinery after taking this medication.  Patient also instructed that they need to avoid drinking alcohol and using other products containing opioids, benzodiazepines, or other sedating drugs.  Patient verbalized understanding.

## 2024-04-20 LAB
BACTERIA SPEC CULT: ABNORMAL
CC UR VC: ABNORMAL
SERVICE CMNT-IMP: ABNORMAL